# Patient Record
Sex: FEMALE | Race: WHITE | NOT HISPANIC OR LATINO | Employment: FULL TIME | URBAN - METROPOLITAN AREA
[De-identification: names, ages, dates, MRNs, and addresses within clinical notes are randomized per-mention and may not be internally consistent; named-entity substitution may affect disease eponyms.]

---

## 2017-04-07 ENCOUNTER — ALLSCRIPTS OFFICE VISIT (OUTPATIENT)
Dept: OTHER | Facility: OTHER | Age: 37
End: 2017-04-07

## 2018-01-17 NOTE — MISCELLANEOUS
Provider Comments  Provider Comments:   no show for appt      Signatures   Electronically signed by : Sandra Bautista DO; Apr 7 2017 10:02AM EST                       (Author)    Electronically signed by : Sandra Bautista DO;  Apr 7 2017 10:02AM EST                       (Author)

## 2019-04-29 ENCOUNTER — OFFICE VISIT (OUTPATIENT)
Dept: OBGYN CLINIC | Facility: CLINIC | Age: 39
End: 2019-04-29
Payer: COMMERCIAL

## 2019-04-29 VITALS
WEIGHT: 111.6 LBS | DIASTOLIC BLOOD PRESSURE: 82 MMHG | BODY MASS INDEX: 17.94 KG/M2 | HEIGHT: 66 IN | SYSTOLIC BLOOD PRESSURE: 118 MMHG

## 2019-04-29 DIAGNOSIS — N89.8 VAGINAL ODOR: ICD-10-CM

## 2019-04-29 DIAGNOSIS — Z12.31 ENCOUNTER FOR SCREENING MAMMOGRAM FOR MALIGNANT NEOPLASM OF BREAST: ICD-10-CM

## 2019-04-29 DIAGNOSIS — N92.6 IRREGULAR MENSES: ICD-10-CM

## 2019-04-29 DIAGNOSIS — R10.32 LLQ PAIN: ICD-10-CM

## 2019-04-29 DIAGNOSIS — Z01.419 ENCOUNTER FOR ROUTINE GYNECOLOGICAL EXAMINATION WITH PAPANICOLAOU SMEAR OF CERVIX: Primary | ICD-10-CM

## 2019-04-29 DIAGNOSIS — Z11.3 SCREENING FOR STDS (SEXUALLY TRANSMITTED DISEASES): ICD-10-CM

## 2019-04-29 PROCEDURE — 87591 N.GONORRHOEAE DNA AMP PROB: CPT | Performed by: OBSTETRICS & GYNECOLOGY

## 2019-04-29 PROCEDURE — 87510 GARDNER VAG DNA DIR PROBE: CPT | Performed by: OBSTETRICS & GYNECOLOGY

## 2019-04-29 PROCEDURE — G0124 SCREEN C/V THIN LAYER BY MD: HCPCS | Performed by: PATHOLOGY

## 2019-04-29 PROCEDURE — 87491 CHLMYD TRACH DNA AMP PROBE: CPT | Performed by: OBSTETRICS & GYNECOLOGY

## 2019-04-29 PROCEDURE — 87660 TRICHOMONAS VAGIN DIR PROBE: CPT | Performed by: OBSTETRICS & GYNECOLOGY

## 2019-04-29 PROCEDURE — G0145 SCR C/V CYTO,THINLAYER,RESCR: HCPCS | Performed by: PATHOLOGY

## 2019-04-29 PROCEDURE — 87624 HPV HI-RISK TYP POOLED RSLT: CPT | Performed by: OBSTETRICS & GYNECOLOGY

## 2019-04-29 PROCEDURE — S0610 ANNUAL GYNECOLOGICAL EXAMINA: HCPCS | Performed by: OBSTETRICS & GYNECOLOGY

## 2019-04-29 PROCEDURE — 87480 CANDIDA DNA DIR PROBE: CPT | Performed by: OBSTETRICS & GYNECOLOGY

## 2019-04-30 LAB
C TRACH DNA SPEC QL NAA+PROBE: NEGATIVE
N GONORRHOEA DNA SPEC QL NAA+PROBE: NEGATIVE

## 2019-05-01 LAB
CANDIDA RRNA VAG QL PROBE: NEGATIVE
G VAGINALIS RRNA GENITAL QL PROBE: POSITIVE
T VAGINALIS RRNA GENITAL QL PROBE: NEGATIVE

## 2019-05-03 ENCOUNTER — HOSPITAL ENCOUNTER (OUTPATIENT)
Dept: ULTRASOUND IMAGING | Facility: HOSPITAL | Age: 39
Discharge: HOME | End: 2019-05-03
Attending: OBSTETRICS & GYNECOLOGY
Payer: COMMERCIAL

## 2019-05-03 DIAGNOSIS — R10.32 LLQ PAIN: ICD-10-CM

## 2019-05-03 DIAGNOSIS — N92.6 IRREGULAR MENSES: ICD-10-CM

## 2019-05-03 PROCEDURE — 76830 TRANSVAGINAL US NON-OB: CPT

## 2019-05-03 PROCEDURE — 76856 US EXAM PELVIC COMPLETE: CPT

## 2019-05-06 DIAGNOSIS — B96.89 BV (BACTERIAL VAGINOSIS): Primary | ICD-10-CM

## 2019-05-06 DIAGNOSIS — N76.0 BV (BACTERIAL VAGINOSIS): Primary | ICD-10-CM

## 2019-05-06 LAB
LAB AP GYN PRIMARY INTERPRETATION: ABNORMAL
Lab: ABNORMAL
PATH INTERP SPEC-IMP: ABNORMAL

## 2019-05-07 ENCOUNTER — TELEPHONE (OUTPATIENT)
Dept: OBGYN CLINIC | Facility: MEDICAL CENTER | Age: 39
End: 2019-05-07

## 2019-05-07 DIAGNOSIS — N83.202 CYST OF LEFT OVARY: Primary | ICD-10-CM

## 2019-05-07 RX ORDER — METRONIDAZOLE 500 MG/1
500 TABLET ORAL EVERY 12 HOURS SCHEDULED
Qty: 14 TABLET | Refills: 0 | Status: SHIPPED | OUTPATIENT
Start: 2019-05-07 | End: 2019-05-14

## 2019-05-09 LAB
HPV HR 12 DNA CVX QL NAA+PROBE: POSITIVE
HPV16 DNA CVX QL NAA+PROBE: NEGATIVE
HPV18 DNA CVX QL NAA+PROBE: NEGATIVE

## 2019-05-17 ENCOUNTER — PROCEDURE VISIT (OUTPATIENT)
Dept: OBGYN CLINIC | Facility: CLINIC | Age: 39
End: 2019-05-17
Payer: COMMERCIAL

## 2019-05-17 VITALS — SYSTOLIC BLOOD PRESSURE: 134 MMHG | WEIGHT: 106.6 LBS | DIASTOLIC BLOOD PRESSURE: 90 MMHG | BODY MASS INDEX: 17.21 KG/M2

## 2019-05-17 DIAGNOSIS — R87.810 ASCUS WITH POSITIVE HIGH RISK HPV CERVICAL: Primary | ICD-10-CM

## 2019-05-17 DIAGNOSIS — N76.0 BACTERIAL VAGINOSIS: ICD-10-CM

## 2019-05-17 DIAGNOSIS — B96.89 BACTERIAL VAGINOSIS: ICD-10-CM

## 2019-05-17 DIAGNOSIS — R87.610 ASCUS WITH POSITIVE HIGH RISK HPV CERVICAL: Primary | ICD-10-CM

## 2019-05-17 DIAGNOSIS — Z76.89 ENCOUNTER FOR BIOPSY: ICD-10-CM

## 2019-05-17 LAB — SL AMB POCT URINE HCG: NEGATIVE

## 2019-05-17 PROCEDURE — 88305 TISSUE EXAM BY PATHOLOGIST: CPT | Performed by: PATHOLOGY

## 2019-05-17 PROCEDURE — 57454 BX/CURETT OF CERVIX W/SCOPE: CPT | Performed by: OBSTETRICS & GYNECOLOGY

## 2019-05-17 PROCEDURE — 81025 URINE PREGNANCY TEST: CPT | Performed by: OBSTETRICS & GYNECOLOGY

## 2019-05-20 ENCOUNTER — TELEPHONE (OUTPATIENT)
Dept: OBGYN CLINIC | Facility: MEDICAL CENTER | Age: 39
End: 2019-05-20

## 2019-05-21 DIAGNOSIS — B96.89 BACTERIAL VAGINOSIS: ICD-10-CM

## 2019-05-21 DIAGNOSIS — N76.0 BACTERIAL VAGINOSIS: ICD-10-CM

## 2019-06-14 ENCOUNTER — OFFICE VISIT (OUTPATIENT)
Dept: OBGYN CLINIC | Facility: CLINIC | Age: 39
End: 2019-06-14
Payer: COMMERCIAL

## 2019-06-14 VITALS — DIASTOLIC BLOOD PRESSURE: 70 MMHG | SYSTOLIC BLOOD PRESSURE: 104 MMHG | BODY MASS INDEX: 17.72 KG/M2 | WEIGHT: 109.8 LBS

## 2019-06-14 DIAGNOSIS — N87.1 DYSPLASIA OF CERVIX, HIGH GRADE CIN 2: Primary | ICD-10-CM

## 2019-06-14 PROCEDURE — 99214 OFFICE O/P EST MOD 30 MIN: CPT | Performed by: OBSTETRICS & GYNECOLOGY

## 2019-06-14 RX ORDER — UBIDECARENONE 75 MG
CAPSULE ORAL DAILY
COMMUNITY

## 2019-06-14 RX ORDER — DIPHENOXYLATE HYDROCHLORIDE AND ATROPINE SULFATE 2.5; .025 MG/1; MG/1
1 TABLET ORAL DAILY
COMMUNITY

## 2019-06-18 ENCOUNTER — HOSPITAL ENCOUNTER (OUTPATIENT)
Dept: ULTRASOUND IMAGING | Facility: HOSPITAL | Age: 39
Discharge: HOME | End: 2019-06-18
Attending: OBSTETRICS & GYNECOLOGY
Payer: COMMERCIAL

## 2019-06-18 DIAGNOSIS — N83.202 CYST OF LEFT OVARY: ICD-10-CM

## 2019-06-18 PROCEDURE — 76856 US EXAM PELVIC COMPLETE: CPT

## 2019-09-13 ENCOUNTER — OFFICE VISIT (OUTPATIENT)
Dept: OBGYN CLINIC | Facility: CLINIC | Age: 39
End: 2019-09-13

## 2019-09-13 VITALS — BODY MASS INDEX: 17 KG/M2 | DIASTOLIC BLOOD PRESSURE: 76 MMHG | WEIGHT: 105.3 LBS | SYSTOLIC BLOOD PRESSURE: 110 MMHG

## 2019-09-13 DIAGNOSIS — Z01.818 PREOPERATIVE EXAM FOR GYNECOLOGIC SURGERY: Primary | ICD-10-CM

## 2019-09-13 DIAGNOSIS — N87.1 CIN II (CERVICAL INTRAEPITHELIAL NEOPLASIA II): ICD-10-CM

## 2019-09-13 PROCEDURE — PREOP: Performed by: OBSTETRICS & GYNECOLOGY

## 2019-09-13 RX ORDER — SODIUM CHLORIDE, SODIUM LACTATE, POTASSIUM CHLORIDE, CALCIUM CHLORIDE 600; 310; 30; 20 MG/100ML; MG/100ML; MG/100ML; MG/100ML
125 INJECTION, SOLUTION INTRAVENOUS CONTINUOUS
Status: CANCELLED | OUTPATIENT
Start: 2019-09-23

## 2019-09-13 NOTE — H&P (VIEW-ONLY)
H&P Exam - Gynecology     Assessment: Diane Ramos was seen today for pre-op exam     Diagnoses and all orders for this visit:    Preoperative exam for gynecologic surgery    DENISE II (cervical intraepithelial neoplasia II)        Plan:  Patient desires to proceed with definitive surgical management  We plan to proceed with LEEP  Consent reviewed and signed  Reviewed risks of procedure  Risks include infection, blood clots, risk of wound healing problems, and possible damage or injury to surrounding structures including bowel, bladder, ureters, blood vessels and nerves  Pre and postoperative instructions reviewed with patient  Entire surgical packet reviewed  Patient will not need to present to pre admissions testing  Procedure planned for 9/23/2019  All questions answered  Subjective:  Patient presents for preoperative examination  Patient was DENISE 2-3 on her colposcopy biopsies  Patient Active Problem List   Diagnosis    DENISE II (cervical intraepithelial neoplasia II)       Past Medical History:   Diagnosis Date    Abnormal Pap smear of cervix        History reviewed  No pertinent surgical history  Family History   Problem Relation Age of Onset    Stroke Mother     Diabetes Maternal Grandmother     Heart disease Paternal Grandfather        Social History     No Known Allergies      Current Outpatient Medications:     cyanocobalamin (VITAMIN B-12) 100 mcg tablet, Take by mouth daily, Disp: , Rfl:     multivitamin (THERAGRAN) TABS, Take 1 tablet by mouth daily, Disp: , Rfl:     Review of Systems  Constitutional :no fever, feels well, no tiredness, no recent weight gain or loss  ENT: no ear ache, no loss of hearing, no nosebleeds or nasal discharge, no sore throat or hoarseness  Cardiovascular: no complaints of slow or fast heart beat, no chest pain, no palpitations, no leg claudication or lower extremity edema    Respiratory: no complaints of shortness of shortness of breath, no MATAMOROS  Breasts:no complaints of breast pain, breast lump, or nipple discharge  Gastrointestinal: no complaints of abdominal pain, constipation, nausea, vomiting, or diarrhea or bloody stools  Genitourinary : no complaints of dysuria, incontinence, pelvic pain, no dysmenorrhea, vaginal discharge or abnormal vaginal bleeding and as noted in HPI  Musculoskeletal: no complaints of arthralgia, no myalgia, no joint swelling or stiffness, no limb pain or swelling  Integumentary: no complaints of skin rash or lesion, itching or dry skin  Neurological: no complaints of headache, no confusion, no numbness or tingling, no dizziness or fainting    Objective:     /76   Wt 47 8 kg (105 lb 4 8 oz)   LMP 09/02/2019   BMI 17 00 kg/m²    General: appears stated age, cooperative, alert normal mood and affect   Psychiatric oriented to person, place and time  Mood and affect normal   Neck: normal, supple,trachea midline, no masses  Thyroid: normal, no thyromegaly   Heart: regular rate and rhythm, S1, S2 normal, no murmur, click, rub or gallop   Lungs: clear to auscultation bilaterally, no increased work of breathing or signs of respiratory distress   Breasts: normal, no dimpling or skin changes noted   Abdomen: soft, non-tender, without masses or organomegaly   Vulva: normal , no lesions   Vagina: normal , no lesions or dryness   Urethra: normal   Urethal meatus normal   Bladder Normal, soft, non-tender and no prolapse or masses appreciated   Cervix: normal, no palpable masses    Uterus: normal , non-tender, not enlarged, no palpable masses   Adnexa: normal, non-tender without fullness or masses   Lymphatic Palpation of lymph nodes in neck, axilla, groin and/or other locations: no lymphadenopathy or masses noted   Skin Normal skin turgor and no rashes    Palpation of skin and subcutaneous tissue normal

## 2019-09-13 NOTE — PROGRESS NOTES
H&P Exam - Gynecology     Assessment: Leila Bain was seen today for pre-op exam     Diagnoses and all orders for this visit:    Preoperative exam for gynecologic surgery    DENISE II (cervical intraepithelial neoplasia II)        Plan:  Patient desires to proceed with definitive surgical management  We plan to proceed with LEEP  Consent reviewed and signed  Reviewed risks of procedure  Risks include infection, blood clots, risk of wound healing problems, and possible damage or injury to surrounding structures including bowel, bladder, ureters, blood vessels and nerves  Pre and postoperative instructions reviewed with patient  Entire surgical packet reviewed  Patient will not need to present to pre admissions testing  Procedure planned for 9/23/2019  All questions answered  Subjective:  Patient presents for preoperative examination  Patient was DENISE 2-3 on her colposcopy biopsies  Patient Active Problem List   Diagnosis    DENISE II (cervical intraepithelial neoplasia II)       Past Medical History:   Diagnosis Date    Abnormal Pap smear of cervix        History reviewed  No pertinent surgical history  Family History   Problem Relation Age of Onset    Stroke Mother     Diabetes Maternal Grandmother     Heart disease Paternal Grandfather        Social History     No Known Allergies      Current Outpatient Medications:     cyanocobalamin (VITAMIN B-12) 100 mcg tablet, Take by mouth daily, Disp: , Rfl:     multivitamin (THERAGRAN) TABS, Take 1 tablet by mouth daily, Disp: , Rfl:     Review of Systems  Constitutional :no fever, feels well, no tiredness, no recent weight gain or loss  ENT: no ear ache, no loss of hearing, no nosebleeds or nasal discharge, no sore throat or hoarseness  Cardiovascular: no complaints of slow or fast heart beat, no chest pain, no palpitations, no leg claudication or lower extremity edema    Respiratory: no complaints of shortness of shortness of breath, no MATAMOROS  Breasts:no complaints of breast pain, breast lump, or nipple discharge  Gastrointestinal: no complaints of abdominal pain, constipation, nausea, vomiting, or diarrhea or bloody stools  Genitourinary : no complaints of dysuria, incontinence, pelvic pain, no dysmenorrhea, vaginal discharge or abnormal vaginal bleeding and as noted in HPI  Musculoskeletal: no complaints of arthralgia, no myalgia, no joint swelling or stiffness, no limb pain or swelling  Integumentary: no complaints of skin rash or lesion, itching or dry skin  Neurological: no complaints of headache, no confusion, no numbness or tingling, no dizziness or fainting    Objective:     /76   Wt 47 8 kg (105 lb 4 8 oz)   LMP 09/02/2019   BMI 17 00 kg/m²    General: appears stated age, cooperative, alert normal mood and affect   Psychiatric oriented to person, place and time  Mood and affect normal   Neck: normal, supple,trachea midline, no masses  Thyroid: normal, no thyromegaly   Heart: regular rate and rhythm, S1, S2 normal, no murmur, click, rub or gallop   Lungs: clear to auscultation bilaterally, no increased work of breathing or signs of respiratory distress   Breasts: normal, no dimpling or skin changes noted   Abdomen: soft, non-tender, without masses or organomegaly   Vulva: normal , no lesions   Vagina: normal , no lesions or dryness   Urethra: normal   Urethal meatus normal   Bladder Normal, soft, non-tender and no prolapse or masses appreciated   Cervix: normal, no palpable masses    Uterus: normal , non-tender, not enlarged, no palpable masses   Adnexa: normal, non-tender without fullness or masses   Lymphatic Palpation of lymph nodes in neck, axilla, groin and/or other locations: no lymphadenopathy or masses noted   Skin Normal skin turgor and no rashes    Palpation of skin and subcutaneous tissue normal

## 2019-09-13 NOTE — H&P
H&P Exam - Gynecology     Assessment: Ledora Dancer was seen today for pre-op exam     Diagnoses and all orders for this visit:    Preoperative exam for gynecologic surgery    DENISE II (cervical intraepithelial neoplasia II)        Plan:  Patient desires to proceed with definitive surgical management  We plan to proceed with LEEP  Consent reviewed and signed  Reviewed risks of procedure  Risks include infection, blood clots, risk of wound healing problems, and possible damage or injury to surrounding structures including bowel, bladder, ureters, blood vessels and nerves  Pre and postoperative instructions reviewed with patient  Entire surgical packet reviewed  Patient will not need to present to pre admissions testing  Procedure planned for 9/23/2019  All questions answered  Subjective:  Patient presents for preoperative examination  Patient was DENISE 2-3 on her colposcopy biopsies  Patient Active Problem List   Diagnosis    DENISE II (cervical intraepithelial neoplasia II)       Past Medical History:   Diagnosis Date    Abnormal Pap smear of cervix        History reviewed  No pertinent surgical history  Family History   Problem Relation Age of Onset    Stroke Mother     Diabetes Maternal Grandmother     Heart disease Paternal Grandfather        Social History     No Known Allergies      Current Outpatient Medications:     cyanocobalamin (VITAMIN B-12) 100 mcg tablet, Take by mouth daily, Disp: , Rfl:     multivitamin (THERAGRAN) TABS, Take 1 tablet by mouth daily, Disp: , Rfl:     Review of Systems  Constitutional :no fever, feels well, no tiredness, no recent weight gain or loss  ENT: no ear ache, no loss of hearing, no nosebleeds or nasal discharge, no sore throat or hoarseness  Cardiovascular: no complaints of slow or fast heart beat, no chest pain, no palpitations, no leg claudication or lower extremity edema    Respiratory: no complaints of shortness of shortness of breath, no MATAMOROS  Breasts:no complaints of breast pain, breast lump, or nipple discharge  Gastrointestinal: no complaints of abdominal pain, constipation, nausea, vomiting, or diarrhea or bloody stools  Genitourinary : no complaints of dysuria, incontinence, pelvic pain, no dysmenorrhea, vaginal discharge or abnormal vaginal bleeding and as noted in HPI  Musculoskeletal: no complaints of arthralgia, no myalgia, no joint swelling or stiffness, no limb pain or swelling  Integumentary: no complaints of skin rash or lesion, itching or dry skin  Neurological: no complaints of headache, no confusion, no numbness or tingling, no dizziness or fainting    Objective:     /76   Wt 47 8 kg (105 lb 4 8 oz)   LMP 09/02/2019   BMI 17 00 kg/m²    General: appears stated age, cooperative, alert normal mood and affect   Psychiatric oriented to person, place and time  Mood and affect normal   Neck: normal, supple,trachea midline, no masses  Thyroid: normal, no thyromegaly   Heart: regular rate and rhythm, S1, S2 normal, no murmur, click, rub or gallop   Lungs: clear to auscultation bilaterally, no increased work of breathing or signs of respiratory distress   Breasts: normal, no dimpling or skin changes noted   Abdomen: soft, non-tender, without masses or organomegaly   Vulva: normal , no lesions   Vagina: normal , no lesions or dryness   Urethra: normal   Urethal meatus normal   Bladder Normal, soft, non-tender and no prolapse or masses appreciated   Cervix: normal, no palpable masses    Uterus: normal , non-tender, not enlarged, no palpable masses   Adnexa: normal, non-tender without fullness or masses   Lymphatic Palpation of lymph nodes in neck, axilla, groin and/or other locations: no lymphadenopathy or masses noted   Skin Normal skin turgor and no rashes    Palpation of skin and subcutaneous tissue normal

## 2019-09-20 ENCOUNTER — ANESTHESIA EVENT (OUTPATIENT)
Dept: PERIOP | Facility: HOSPITAL | Age: 39
End: 2019-09-20
Payer: COMMERCIAL

## 2019-09-20 ENCOUNTER — TELEPHONE (OUTPATIENT)
Dept: OBGYN CLINIC | Facility: MEDICAL CENTER | Age: 39
End: 2019-09-20

## 2019-09-20 RX ORDER — NAPROXEN SODIUM 220 MG
220 TABLET ORAL AS NEEDED
COMMUNITY

## 2019-09-20 NOTE — PRE-PROCEDURE INSTRUCTIONS
Pre-Surgery Instructions:   Medication Instructions    cyanocobalamin (VITAMIN B-12) 100 mcg tablet Instructed patient per Anesthesia Guidelines   multivitamin (THERAGRAN) TABS Instructed patient per Anesthesia Guidelines   naproxen sodium (ALEVE) 220 MG tablet Instructed patient per Anesthesia Guidelines  Per anesthesia patient was told to stop NSAIDS and supplements as of today and no medications are needed on morning of surgery  Patient states she had wanted Dr to also remove an ovarian cyst at time of this procedure and plans to reach Dr today to discuss

## 2019-09-20 NOTE — TELEPHONE ENCOUNTER
Pt called with questions in regards to LEEP 9/24/19  Pt under impression she would also be having ovarian cyst removed  Per Dr Fernanda Perry cyst was resolving on it's own based off of last ultrasound so surgery was not necessary  Pt aware

## 2019-09-23 ENCOUNTER — HOSPITAL ENCOUNTER (OUTPATIENT)
Facility: HOSPITAL | Age: 39
Setting detail: OUTPATIENT SURGERY
Discharge: HOME | End: 2019-09-23
Attending: OBSTETRICS & GYNECOLOGY | Admitting: OBSTETRICS & GYNECOLOGY
Payer: COMMERCIAL

## 2019-09-23 ENCOUNTER — ANESTHESIA (OUTPATIENT)
Dept: PERIOP | Facility: HOSPITAL | Age: 39
End: 2019-09-23
Payer: COMMERCIAL

## 2019-09-23 VITALS
HEIGHT: 66 IN | OXYGEN SATURATION: 100 % | WEIGHT: 105 LBS | SYSTOLIC BLOOD PRESSURE: 130 MMHG | RESPIRATION RATE: 15 BRPM | DIASTOLIC BLOOD PRESSURE: 78 MMHG | TEMPERATURE: 97.5 F | HEART RATE: 59 BPM | BODY MASS INDEX: 16.88 KG/M2

## 2019-09-23 DIAGNOSIS — Z98.890 S/P LEEP (LOOP ELECTROSURGICAL EXCISION PROCEDURE): Primary | ICD-10-CM

## 2019-09-23 DIAGNOSIS — N87.1 CIN II (CERVICAL INTRAEPITHELIAL NEOPLASIA II): ICD-10-CM

## 2019-09-23 LAB
EXT PREGNANCY TEST URINE: NEGATIVE
EXT. CONTROL: NORMAL

## 2019-09-23 PROCEDURE — 88307 TISSUE EXAM BY PATHOLOGIST: CPT | Performed by: PATHOLOGY

## 2019-09-23 PROCEDURE — 57460 BX OF CERVIX W/SCOPE LEEP: CPT | Performed by: OBSTETRICS & GYNECOLOGY

## 2019-09-23 PROCEDURE — 81025 URINE PREGNANCY TEST: CPT | Performed by: OBSTETRICS & GYNECOLOGY

## 2019-09-23 RX ORDER — MIDAZOLAM HYDROCHLORIDE 1 MG/ML
INJECTION INTRAMUSCULAR; INTRAVENOUS AS NEEDED
Status: DISCONTINUED | OUTPATIENT
Start: 2019-09-23 | End: 2019-09-23 | Stop reason: SURG

## 2019-09-23 RX ORDER — IBUPROFEN 400 MG/1
600 TABLET ORAL EVERY 6 HOURS PRN
Qty: 20 TABLET | Refills: 0 | Status: SHIPPED | OUTPATIENT
Start: 2019-09-23

## 2019-09-23 RX ORDER — LIDOCAINE HYDROCHLORIDE 10 MG/ML
INJECTION, SOLUTION INFILTRATION; PERINEURAL AS NEEDED
Status: DISCONTINUED | OUTPATIENT
Start: 2019-09-23 | End: 2019-09-23 | Stop reason: SURG

## 2019-09-23 RX ORDER — ONDANSETRON 2 MG/ML
4 INJECTION INTRAMUSCULAR; INTRAVENOUS ONCE AS NEEDED
Status: DISCONTINUED | OUTPATIENT
Start: 2019-09-23 | End: 2019-09-23 | Stop reason: HOSPADM

## 2019-09-23 RX ORDER — ACETAMINOPHEN 325 MG/1
650 TABLET ORAL EVERY 6 HOURS PRN
Status: DISCONTINUED | OUTPATIENT
Start: 2019-09-23 | End: 2019-09-23 | Stop reason: HOSPADM

## 2019-09-23 RX ORDER — DEXAMETHASONE SODIUM PHOSPHATE 10 MG/ML
INJECTION, SOLUTION INTRAMUSCULAR; INTRAVENOUS AS NEEDED
Status: DISCONTINUED | OUTPATIENT
Start: 2019-09-23 | End: 2019-09-23 | Stop reason: SURG

## 2019-09-23 RX ORDER — ONDANSETRON 2 MG/ML
INJECTION INTRAMUSCULAR; INTRAVENOUS AS NEEDED
Status: DISCONTINUED | OUTPATIENT
Start: 2019-09-23 | End: 2019-09-23 | Stop reason: SURG

## 2019-09-23 RX ORDER — SODIUM CHLORIDE 9 MG/ML
INJECTION, SOLUTION INTRAVENOUS AS NEEDED
Status: DISCONTINUED | OUTPATIENT
Start: 2019-09-23 | End: 2019-09-23 | Stop reason: HOSPADM

## 2019-09-23 RX ORDER — ONDANSETRON 2 MG/ML
4 INJECTION INTRAMUSCULAR; INTRAVENOUS EVERY 6 HOURS PRN
Status: DISCONTINUED | OUTPATIENT
Start: 2019-09-23 | End: 2019-09-23 | Stop reason: HOSPADM

## 2019-09-23 RX ORDER — PROPOFOL 10 MG/ML
INJECTION, EMULSION INTRAVENOUS AS NEEDED
Status: DISCONTINUED | OUTPATIENT
Start: 2019-09-23 | End: 2019-09-23 | Stop reason: SURG

## 2019-09-23 RX ORDER — SODIUM CHLORIDE, SODIUM LACTATE, POTASSIUM CHLORIDE, CALCIUM CHLORIDE 600; 310; 30; 20 MG/100ML; MG/100ML; MG/100ML; MG/100ML
125 INJECTION, SOLUTION INTRAVENOUS CONTINUOUS
Status: DISCONTINUED | OUTPATIENT
Start: 2019-09-23 | End: 2019-09-23 | Stop reason: HOSPADM

## 2019-09-23 RX ORDER — SODIUM CHLORIDE 9 MG/ML
125 INJECTION, SOLUTION INTRAVENOUS CONTINUOUS
Status: DISCONTINUED | OUTPATIENT
Start: 2019-09-23 | End: 2019-09-23 | Stop reason: HOSPADM

## 2019-09-23 RX ORDER — ACETIC ACID 5 %
LIQUID (ML) MISCELLANEOUS AS NEEDED
Status: DISCONTINUED | OUTPATIENT
Start: 2019-09-23 | End: 2019-09-23 | Stop reason: HOSPADM

## 2019-09-23 RX ORDER — FENTANYL CITRATE/PF 50 MCG/ML
25 SYRINGE (ML) INJECTION
Status: DISCONTINUED | OUTPATIENT
Start: 2019-09-23 | End: 2019-09-23 | Stop reason: HOSPADM

## 2019-09-23 RX ORDER — SENNOSIDES 8.6 MG
650 CAPSULE ORAL EVERY 8 HOURS PRN
Qty: 30 TABLET | Refills: 0 | Status: SHIPPED | OUTPATIENT
Start: 2019-09-23

## 2019-09-23 RX ORDER — IBUPROFEN 600 MG/1
600 TABLET ORAL EVERY 6 HOURS PRN
Status: DISCONTINUED | OUTPATIENT
Start: 2019-09-23 | End: 2019-09-23 | Stop reason: HOSPADM

## 2019-09-23 RX ADMIN — LIDOCAINE HYDROCHLORIDE 50 MG: 10 INJECTION, SOLUTION INFILTRATION; PERINEURAL at 07:37

## 2019-09-23 RX ADMIN — FENTANYL CITRATE 25 MCG: 50 INJECTION INTRAMUSCULAR; INTRAVENOUS at 08:56

## 2019-09-23 RX ADMIN — SODIUM CHLORIDE 125 ML/HR: 0.9 INJECTION, SOLUTION INTRAVENOUS at 08:55

## 2019-09-23 RX ADMIN — FENTANYL CITRATE 25 MCG: 50 INJECTION INTRAMUSCULAR; INTRAVENOUS at 08:47

## 2019-09-23 RX ADMIN — PROPOFOL 200 MG: 10 INJECTION, EMULSION INTRAVENOUS at 07:37

## 2019-09-23 RX ADMIN — SODIUM CHLORIDE 125 ML/HR: 0.9 INJECTION, SOLUTION INTRAVENOUS at 06:26

## 2019-09-23 RX ADMIN — ONDANSETRON 4 MG: 2 INJECTION INTRAMUSCULAR; INTRAVENOUS at 07:59

## 2019-09-23 RX ADMIN — DEXAMETHASONE SODIUM PHOSPHATE 4 MG: 10 INJECTION, SOLUTION INTRAMUSCULAR; INTRAVENOUS at 07:59

## 2019-09-23 RX ADMIN — MIDAZOLAM 2 MG: 1 INJECTION INTRAMUSCULAR; INTRAVENOUS at 07:32

## 2019-09-23 NOTE — DISCHARGE INSTRUCTIONS
Loop Electrosurgical Excision Procedure   WHAT YOU NEED TO KNOW:   You may have cramping, bleeding, or dark brown discharge after your procedure  These symptoms may last up to 4 weeks  DISCHARGE INSTRUCTIONS:   Seek care immediately if:   · You have severe pain in your lower abdomen  · You soak through 1 sanitary pad in 1 hour or less  · You feel weak, dizzy, or faint  Contact your healthcare provider if:   · You have a fever, chills, or foul-smelling discharge  · You have bleeding with clots  · Your bleeding is heavier than your menstrual period  · Your pain gets worse or does not get better after you take pain medicine  · You have questions or concerns about your condition or care  Medicines:   · NSAIDs , such as ibuprofen, help decrease swelling, pain, and fever  NSAIDs can cause stomach bleeding or kidney problems in certain people  If you take blood thinner medicine, always ask your healthcare provider if NSAIDs are safe for you  Always read the medicine label and follow directions  · Take your medicine as directed  Contact your healthcare provider if you think your medicine is not helping or if you have side effects  Tell him or her if you are allergic to any medicine  Keep a list of the medicines, vitamins, and herbs you take  Include the amounts, and when and why you take them  Bring the list or the pill bottles to follow-up visits  Carry your medicine list with you in case of an emergency  Activity:  Rest for 48 hours or as directed  Do not exercise, play sports, or lift anything heavier than 5 pounds  Do not go swimming or get in a hot tub  Ask your healthcare provider when you can return to your usual activities  Bathing:  Shower only after your procedure  Do not take a bath  Baths may increase your risk for an infection  Ask your healthcare provider how long to follow these instructions     Do not put anything in your vagina for 2 weeks:  Do not douche, use medicines in your vagina, or have sex  Do not use tampons  Instead, wear a sanitary pad for bleeding  Get pap smears as directed:  A pap smear can help diagnose cervical cancer early  Cervical cancer that is diagnosed early is easier to treat  Do not smoke:  Nicotine and other chemicals in cigarettes and cigars can increase your risk for cervical cancer  Ask your healthcare provider for more information if you currently smoke and need help to quit  E-cigarettes or smokeless tobacco still contain nicotine  Talk to your healthcare provider before you use these products  Practice safe sex:  Safe sex can help decrease your risk for sexually transmitted infections (STIs)  STIs can cause cervical cancer  Limit your number of sex partners  Use condoms and barrier methods for all types of sexual contact  Use a new condom or latex barrier each time you have sex  This includes oral, vaginal, and anal sex  Make sure that the condom fits and is put on correctly  Follow up with your healthcare provider as directed:  Write down your questions so you remember to ask them during your visits  © 2017 [facility] Information is for End User's use only and may not be sold, redistributed or otherwise used for commercial purposes  All illustrations and images included in CareNotes® are the copyrighted property of [employer] , [employer]  or Amauri Aguilera  The above information is an  only  It is not intended as medical advice for individual conditions or treatments  Talk to your doctor, nurse or pharmacist before following any medical regimen to see if it is safe and effective for you

## 2019-09-23 NOTE — INTERVAL H&P NOTE
H&P reviewed  After examining the patient I find no changes in the patients condition since the H&P had been written      Vitals:    09/23/19 0616   BP: 129/79   Pulse: 55   Resp: 16   Temp: 98 °F (36 7 °C)   SpO2: 100%

## 2019-09-23 NOTE — ANESTHESIA PREPROCEDURE EVALUATION
Review of Systems/Medical History  Patient summary reviewed  Chart reviewed  No history of anesthetic complications     Cardiovascular  Negative cardio ROS    Pulmonary  Negative pulmonary ROS        GI/Hepatic  Negative GI/hepatic ROS          Negative  ROS        Endo/Other  Negative endo/other ROS      GYN      Comment: Abnormal Pap-DENISE II     Hematology  Negative hematology ROS      Musculoskeletal  Negative musculoskeletal ROS        Neurology  Negative neurology ROS      Psychology   Negative psychology ROS              Physical Exam    Airway    Mallampati score: II  TM Distance: >3 FB  Neck ROM: full     Dental   No notable dental hx     Cardiovascular  Comment: Negative ROS, Rhythm: regular, Rate: normal, Cardiovascular exam normal    Pulmonary  Pulmonary exam normal Breath sounds clear to auscultation,     Other Findings        Anesthesia Plan  ASA Score- 2     Anesthesia Type- general with ASA Monitors  Additional Monitors:   Airway Plan:         Plan Factors-Patient not instructed to abstain from smoking on day of procedure  Patient did not smoke on day of surgery  Induction- intravenous  Postoperative Plan-     Informed Consent- Anesthetic plan and risks discussed with patient

## 2019-09-23 NOTE — OP NOTE
OPERATIVE REPORT  PATIENT NAME: Luci Butler    :  1980  MRN: [id number]  Pt Location: [facility] OR ROOM 03    SURGERY DATE: 2019    Surgeon(s) and Role:     * Laura Underwood MD - Primary    Preop Diagnosis:  DENISE II (cervical intraepithelial neoplasia II) [N87 1]    Post-Op Diagnosis Codes:     * DENISE II (cervical intraepithelial neoplasia II) [N87 1]    Procedure(s) (LRB):  BIOPSY LEEP CERVIX (N/A)    Specimen(s):  ID Type Source Tests Collected by Time Destination   1 : suture marks superior cervical lip Tissue Cervix TISSUE EXAM Laura Underwood MD 2019 [id number]        Estimated Blood Loss:   Minimal    Drains:  none    Anesthesia Type:   General     Operative Indications:  DENISE II (cervical intraepithelial neoplasia II) [N87 1]      Operative Findings:  Normal appearing external feminine genitals  Bi-manual examination with uterus in middle position  Colposcopy with evident whitish flat lesion at 3 o'clock in relation with external OS  Nabothian cysts evidence in posterior cervical lip  Complications:   None    Surgical risks: The patient was informed of all the risks and benefits of a loop electrosurgical excision procedure  Risks included but were not limited to bleeding, infection, injury to the vulva, vagina, cervix, uterine perforation, or negative effects on future pregnancy outcomes  The patient expressed understanding the risks involved, all portions were answered, the patient was consented to the procedure  Description of the procedure: The patient was taken to the operating room  IV sedation was administered and found to be adequate  The patient was then positioned on the operating table in dorsolithotomy position with legs supported by stirrups and SCDs bilaterally  All pressure points were padded  Warm blanket was placed to maintain control of core body temperature  The patient was then prepped and draped in usual sterile fashion   A bimanual exam was performed and uterus is found to be middle position  Operative technique: A timeout was performed to confirm correct patient and correct procedure  The bladder was emptied with a straight catheter and 50 milliliters of clear yellow urine were obtained  A bivalved coated speculum was inserted into the vagina and the cervix was visualized  colposcopy was performed with evidence of lesion at 3 o'clock positions, 5 milliliters total  The 20x15 loop electrode was selected and used to excise the cervical sample using the 20 x 15 loop  The gross specimen was removed and sent to pathology, a second excision was performed using the 20 x 12 loop electrode  The specimens were sent to pathology  Ball electrocautery was used to obtain adequate hemostasis  Monsel's was then applied to maintain hemostasis  Good hemostasis was confirmed  The bivalve speculum was removed from the vagina  At the completion of the procedure all needle, sponge, instrument counts were noted to be correct ×2  Dr Tavares Mireles was present for all key portions of the procedure        Patient Disposition:  PACU     SIGNATURE: Bryon Richards MD  DATE: September 23, 2019  TIME: 8:30 AM

## 2019-09-25 ENCOUNTER — TELEPHONE (OUTPATIENT)
Dept: OBGYN CLINIC | Facility: MEDICAL CENTER | Age: 39
End: 2019-09-25

## 2019-09-25 NOTE — TELEPHONE ENCOUNTER
RAOUL 9/23/19 with Dr Zachary Chi   C/O mild cramping relieved with motrin  States minimal brown vaginal drainage  Has f/u appointment scheduled

## 2019-09-28 ENCOUNTER — TELEPHONE (OUTPATIENT)
Dept: OTHER | Facility: OTHER | Age: 39
End: 2019-09-28

## 2019-09-28 NOTE — TELEPHONE ENCOUNTER
Daren Rodriguez 1980  Call Id: [id number]  [facility] Name:  Relationship To  Patient: Self  Return Phone Number: [phone] (Home)  Presenting Problem: "I had a pre cancerous cells removed  from my cervix on Monday and I am  currently bleeding a lot "  Nurse Assessment  Nurse: STEFANIA Oleary Austin Date/Time: 9/28/2019 2:31:41 PM  Type of assessment required:  ---General (Adult or Child)  Duration of Current S/S  ---Vaginal bleeding increased at about 2230 last night after being on her feet for 2 hours  earlier in the day  Location/Radiation  ---Vaginal  Temperature (F) and route:  ---Denied  Symptom Specific Meds (Dose/Time):  ---Ibuprofen, 200 mg, one tablet @ 2300 yesterday  Denies being on blood thinners  Other S/S  ---Soaked a pad between 0030 until 0200  Woke up again at 0430 and pad was soaked  Soaked again at 0900  Soaked a pad again by 1100  Soaking a pad once an hour for the  past 3 5 hours  Passed blood clot with last change  Denies cramping  Left side pain,  over ovary  Pain Scale on scale of 1-10, 10 being the worst:  ---Side pain rated 2/10  Symptom progression:  ---worse  Intake and Output  ---Normal appetite, fluid intake and urine output  LMP/ Pregnancy:  ---Patient is due for her period now  Breastfeeding  ---No  Last Exam/Treatment:  ---9/23/19 had a loop electrosurgical excision procedure for cervical intraepithelial  neoplasia II  Protocols  Protocol Title Nurse Date/Time  Vaginal Bleeding - Abnormal Ranft, RN, Kenney 9/28/2019 2:44:33 PM  Question Caller Affirmed  Disp   Time Disposition Final User  9/28/2019 3:01:30 PM See Physician within 4 Hours (or PCP  triage)  STEFANIA Oleary Austin  9/28/2019 3:03:27 PM Send to Follow Up Queue STEFANIA Oleary Austin  9/28/2019 5:21:58 PM RN Triaged STEFANIA Oleary Austin  9/28/2019 5:22:20 PM RN Triaged Yes STEFANIA Oleary, EVANGELISTA DEUTSCH  [facility] FOR [facility] WITH DEVELOPMENTAL Advice Given Per Protocol  SEE PHYSICIAN WITHIN 4 HOURS (or PCP triage): * IF OFFICE WILL BE CLOSED AND PCP TRIAGE REQUIRED: You may  need to be seen  Your doctor will want to talk with you to decide what's best  I'll page the doctor now  If you haven't heard from the oncall  doctor within 30 minutes, call again  NOTE: If PCP can't be reached, send to [facility] or ER  CALL BACK IF: * You become worse  CARE ADVICE given per Vaginal Bleeding, Abnormal (Adult) guideline  Dr Colt Lujan was paged via AP pager @ [phone]: [phone]/  Denver Colander RN/ [employer]/ Chaz Borden/ 1980/ heavy bleeding, S/P LEEP on 9 23 19  Dr  Beckey Boon called back at [phone] and was notified  of the procedure patient had done and heavy bleeding  MD was notified that patient is due for her period now  MD advised that after a  LEEP, the first menses is usually heavy  She advised that patient take 600 mg of Ibuprofen now and then every 6 hours PRN for heavy  bleeding or pain  Advised that patient should go to the ER at [facility] if bleeding doesn't slow down within 1-2 hours  Advised that if patient is not comfortable in trying this hoecare advice, she should go to the ER now  Requested that she be paged to  notify her if patient decides to go to the ER  Dr Colt Lujan was paged via AP @ : [phone]/ Denver Colander RN/ [employer]/ Clarice 58  1980/ patient is going to take Ibuprofen now, I will do a follwo-up call on her in about 1 5 hours  Caller Understands: Yes  Caller Disagree/Comply: Comply  PreDisposition: Unsure  Comments  User: Obie Madrid RN Date/Time: 9/28/2019 3:03:11 PM  I called patient and relayed Dr Kevin Reyes advice to her  Patient is going to try taking Ibuprofen to see if bleeding will slow down  I  will call her in about 1 5 hours for follow-up  [facility] wor[facility], but to call  me back before going so that I can notify Dr Colt Lujan  User: Obie Madrid RN Date/Time: 9/28/2019 5:21:21 PM  Follow-up call completed  Patient stated that she is not bleeding as heavily since she took ibuprofen, 600 mg   I paged Dr Colt Lujan via [facility] [employer] @ [phone]: [phone]/ Izabella Trujillo RN/ [employer]/ Key Borden/ 1980/ notifying you that bleeding decreased, no call back needed

## 2019-10-02 ENCOUNTER — HOSPITAL ENCOUNTER (EMERGENCY)
Facility: HOSPITAL | Age: 39
End: 2019-10-03
Attending: INTERNAL MEDICINE | Admitting: INTERNAL MEDICINE
Payer: COMMERCIAL

## 2019-10-02 DIAGNOSIS — R58 EXTRAVASATION OF BLOOD: Primary | ICD-10-CM

## 2019-10-02 DIAGNOSIS — N93.9 VAGINAL BLEEDING: ICD-10-CM

## 2019-10-02 PROCEDURE — 99285 EMERGENCY DEPT VISIT HI MDM: CPT

## 2019-10-02 PROCEDURE — 85025 COMPLETE CBC W/AUTO DIFF WBC: CPT | Performed by: INTERNAL MEDICINE

## 2019-10-02 PROCEDURE — 84702 CHORIONIC GONADOTROPIN TEST: CPT | Performed by: INTERNAL MEDICINE

## 2019-10-02 PROCEDURE — 36415 COLL VENOUS BLD VENIPUNCTURE: CPT | Performed by: INTERNAL MEDICINE

## 2019-10-03 ENCOUNTER — APPOINTMENT (EMERGENCY)
Dept: CT IMAGING | Facility: HOSPITAL | Age: 39
End: 2019-10-03
Payer: COMMERCIAL

## 2019-10-03 ENCOUNTER — ANESTHESIA (OUTPATIENT)
Dept: PERIOP | Facility: HOSPITAL | Age: 39
End: 2019-10-03
Payer: COMMERCIAL

## 2019-10-03 ENCOUNTER — ANESTHESIA EVENT (OUTPATIENT)
Dept: PERIOP | Facility: HOSPITAL | Age: 39
End: 2019-10-03
Payer: COMMERCIAL

## 2019-10-03 ENCOUNTER — HOSPITAL ENCOUNTER (OUTPATIENT)
Facility: HOSPITAL | Age: 39
Setting detail: OBSERVATION
LOS: 1 days | Discharge: HOME | End: 2019-10-03
Attending: OBSTETRICS & GYNECOLOGY | Admitting: OBSTETRICS & GYNECOLOGY
Payer: COMMERCIAL

## 2019-10-03 VITALS
SYSTOLIC BLOOD PRESSURE: 168 MMHG | TEMPERATURE: 98 F | DIASTOLIC BLOOD PRESSURE: 71 MMHG | HEART RATE: 66 BPM | HEIGHT: 66 IN | RESPIRATION RATE: 18 BRPM | BODY MASS INDEX: 16.71 KG/M2 | OXYGEN SATURATION: 100 % | WEIGHT: 104 LBS

## 2019-10-03 VITALS
DIASTOLIC BLOOD PRESSURE: 65 MMHG | OXYGEN SATURATION: 99 % | RESPIRATION RATE: 16 BRPM | SYSTOLIC BLOOD PRESSURE: 110 MMHG | HEART RATE: 82 BPM | TEMPERATURE: 97.5 F

## 2019-10-03 DIAGNOSIS — N99.820 POSTOPERATIVE VAGINAL BLEEDING FOLLOWING GENITOURINARY PROCEDURE: Primary | ICD-10-CM

## 2019-10-03 LAB
ABO GROUP BLD: NORMAL
ALBUMIN SERPL BCP-MCNC: 4.1 G/DL (ref 3.5–5.7)
ALP SERPL-CCNC: 67 U/L (ref 40–150)
ALT SERPL W P-5'-P-CCNC: 17 U/L (ref 7–52)
ANION GAP SERPL CALCULATED.3IONS-SCNC: 5 MMOL/L (ref 4–13)
APTT PPP: 31 SECONDS (ref 23–37)
AST SERPL W P-5'-P-CCNC: 11 U/L (ref 13–39)
B-HCG SERPL-ACNC: <0.6 MIU/ML (ref 0–11.6)
BASOPHILS # BLD AUTO: 0.1 THOUSANDS/ΜL (ref 0–0.1)
BASOPHILS NFR BLD AUTO: 1 % (ref 0–2)
BILIRUB SERPL-MCNC: 0.3 MG/DL (ref 0.2–1)
BLD GP AB SCN SERPL QL: NEGATIVE
BUN SERPL-MCNC: 18 MG/DL (ref 7–25)
CALCIUM SERPL-MCNC: 9.4 MG/DL (ref 8.6–10.5)
CHLORIDE SERPL-SCNC: 105 MMOL/L (ref 98–107)
CO2 SERPL-SCNC: 27 MMOL/L (ref 21–31)
CREAT SERPL-MCNC: 0.83 MG/DL (ref 0.6–1.2)
EOSINOPHIL # BLD AUTO: 0.1 THOUSAND/ΜL (ref 0–0.61)
EOSINOPHIL NFR BLD AUTO: 2 % (ref 0–5)
ERYTHROCYTE [DISTWIDTH] IN BLOOD BY AUTOMATED COUNT: 12.9 % (ref 11.6–15.1)
ERYTHROCYTE [DISTWIDTH] IN BLOOD BY AUTOMATED COUNT: 13.6 % (ref 11.5–14.5)
GFR SERPL CREATININE-BSD FRML MDRD: 88 ML/MIN/1.73SQ M
GLUCOSE SERPL-MCNC: 110 MG/DL (ref 65–99)
HCT VFR BLD AUTO: 30.6 % (ref 34.8–46.1)
HCT VFR BLD AUTO: 38.8 % (ref 42–47)
HGB BLD-MCNC: 12.9 G/DL (ref 12–16)
HGB BLD-MCNC: 9.7 G/DL (ref 11.5–15.4)
INR PPP: 1.08 (ref 0.84–1.19)
LIPASE SERPL-CCNC: 66 U/L (ref 11–82)
LYMPHOCYTES # BLD AUTO: 1.2 THOUSANDS/ΜL (ref 0.6–4.47)
LYMPHOCYTES NFR BLD AUTO: 14 % (ref 21–51)
MCH RBC QN AUTO: 30.2 PG (ref 26.8–34.3)
MCH RBC QN AUTO: 30.7 PG (ref 26–34)
MCHC RBC AUTO-ENTMCNC: 31.7 G/DL (ref 31.4–37.4)
MCHC RBC AUTO-ENTMCNC: 33.2 G/DL (ref 31–37)
MCV RBC AUTO: 93 FL (ref 81–99)
MCV RBC AUTO: 95 FL (ref 82–98)
MONOCYTES # BLD AUTO: 0.7 THOUSAND/ΜL (ref 0.17–1.22)
MONOCYTES NFR BLD AUTO: 9 % (ref 2–12)
NEUTROPHILS # BLD AUTO: 6.4 THOUSANDS/ΜL (ref 1.4–6.5)
NEUTS SEG NFR BLD AUTO: 75 % (ref 42–75)
PLATELET # BLD AUTO: 231 THOUSANDS/UL (ref 149–390)
PLATELET # BLD AUTO: 285 THOUSANDS/UL (ref 149–390)
PMV BLD AUTO: 11.1 FL (ref 8.9–12.7)
PMV BLD AUTO: 9.7 FL (ref 8.6–11.7)
POTASSIUM SERPL-SCNC: 4.1 MMOL/L (ref 3.5–5.5)
PROT SERPL-MCNC: 6.7 G/DL (ref 6.4–8.9)
PROTHROMBIN TIME: 14.1 SECONDS (ref 11.6–14.5)
RBC # BLD AUTO: 3.21 MILLION/UL (ref 3.81–5.12)
RBC # BLD AUTO: 4.2 MILLION/UL (ref 3.9–5.2)
RH BLD: NEGATIVE
SODIUM SERPL-SCNC: 137 MMOL/L (ref 134–143)
SPECIMEN EXPIRATION DATE: NORMAL
WBC # BLD AUTO: 5.57 THOUSAND/UL (ref 4.31–10.16)
WBC # BLD AUTO: 8.5 THOUSAND/UL (ref 4.8–10.8)

## 2019-10-03 PROCEDURE — 85027 COMPLETE CBC AUTOMATED: CPT | Performed by: OBSTETRICS & GYNECOLOGY

## 2019-10-03 PROCEDURE — 99225 PR SBSQ OBSERVATION CARE/DAY 25 MINUTES: CPT | Performed by: OBSTETRICS & GYNECOLOGY

## 2019-10-03 PROCEDURE — 96360 HYDRATION IV INFUSION INIT: CPT

## 2019-10-03 PROCEDURE — 80053 COMPREHEN METABOLIC PANEL: CPT | Performed by: INTERNAL MEDICINE

## 2019-10-03 PROCEDURE — 57720 REVISION OF CERVIX: CPT | Performed by: OBSTETRICS & GYNECOLOGY

## 2019-10-03 PROCEDURE — 86920 COMPATIBILITY TEST SPIN: CPT

## 2019-10-03 PROCEDURE — 96361 HYDRATE IV INFUSION ADD-ON: CPT

## 2019-10-03 PROCEDURE — 74177 CT ABD & PELVIS W/CONTRAST: CPT

## 2019-10-03 PROCEDURE — 36415 COLL VENOUS BLD VENIPUNCTURE: CPT | Performed by: INTERNAL MEDICINE

## 2019-10-03 PROCEDURE — 85730 THROMBOPLASTIN TIME PARTIAL: CPT | Performed by: OBSTETRICS & GYNECOLOGY

## 2019-10-03 PROCEDURE — 86900 BLOOD TYPING SEROLOGIC ABO: CPT | Performed by: OBSTETRICS & GYNECOLOGY

## 2019-10-03 PROCEDURE — 85610 PROTHROMBIN TIME: CPT | Performed by: OBSTETRICS & GYNECOLOGY

## 2019-10-03 PROCEDURE — 86850 RBC ANTIBODY SCREEN: CPT | Performed by: OBSTETRICS & GYNECOLOGY

## 2019-10-03 PROCEDURE — NC001 PR NO CHARGE: Performed by: OBSTETRICS & GYNECOLOGY

## 2019-10-03 PROCEDURE — 86901 BLOOD TYPING SEROLOGIC RH(D): CPT | Performed by: OBSTETRICS & GYNECOLOGY

## 2019-10-03 PROCEDURE — 72191 CT ANGIOGRAPH PELV W/O&W/DYE: CPT

## 2019-10-03 PROCEDURE — 83690 ASSAY OF LIPASE: CPT | Performed by: INTERNAL MEDICINE

## 2019-10-03 RX ORDER — FENTANYL CITRATE 50 UG/ML
INJECTION, SOLUTION INTRAMUSCULAR; INTRAVENOUS AS NEEDED
Status: DISCONTINUED | OUTPATIENT
Start: 2019-10-03 | End: 2019-10-03 | Stop reason: SURG

## 2019-10-03 RX ORDER — OXYCODONE HYDROCHLORIDE 5 MG/1
5 TABLET ORAL EVERY 4 HOURS PRN
Status: DISCONTINUED | OUTPATIENT
Start: 2019-10-03 | End: 2019-10-03 | Stop reason: HOSPADM

## 2019-10-03 RX ORDER — MEPERIDINE HYDROCHLORIDE 50 MG/ML
12.5 INJECTION INTRAMUSCULAR; INTRAVENOUS; SUBCUTANEOUS ONCE AS NEEDED
Status: DISCONTINUED | OUTPATIENT
Start: 2019-10-03 | End: 2019-10-03 | Stop reason: HOSPADM

## 2019-10-03 RX ORDER — SUCCINYLCHOLINE/SOD CL,ISO/PF 100 MG/5ML
SYRINGE (ML) INTRAVENOUS AS NEEDED
Status: DISCONTINUED | OUTPATIENT
Start: 2019-10-03 | End: 2019-10-03 | Stop reason: SURG

## 2019-10-03 RX ORDER — SODIUM CHLORIDE 9 MG/ML
INJECTION, SOLUTION INTRAVENOUS CONTINUOUS PRN
Status: DISCONTINUED | OUTPATIENT
Start: 2019-10-03 | End: 2019-10-03 | Stop reason: SURG

## 2019-10-03 RX ORDER — SODIUM CHLORIDE 9 MG/ML
125 INJECTION, SOLUTION INTRAVENOUS CONTINUOUS
Status: DISCONTINUED | OUTPATIENT
Start: 2019-10-03 | End: 2019-10-03 | Stop reason: HOSPADM

## 2019-10-03 RX ORDER — KETOROLAC TROMETHAMINE 30 MG/ML
INJECTION, SOLUTION INTRAMUSCULAR; INTRAVENOUS AS NEEDED
Status: DISCONTINUED | OUTPATIENT
Start: 2019-10-03 | End: 2019-10-03 | Stop reason: SURG

## 2019-10-03 RX ORDER — IBUPROFEN 600 MG/1
600 TABLET ORAL EVERY 6 HOURS SCHEDULED
Status: DISCONTINUED | OUTPATIENT
Start: 2019-10-03 | End: 2019-10-03 | Stop reason: HOSPADM

## 2019-10-03 RX ORDER — ONDANSETRON 2 MG/ML
4 INJECTION INTRAMUSCULAR; INTRAVENOUS EVERY 6 HOURS PRN
Status: DISCONTINUED | OUTPATIENT
Start: 2019-10-03 | End: 2019-10-03 | Stop reason: HOSPADM

## 2019-10-03 RX ORDER — ONDANSETRON 2 MG/ML
4 INJECTION INTRAMUSCULAR; INTRAVENOUS ONCE AS NEEDED
Status: DISCONTINUED | OUTPATIENT
Start: 2019-10-03 | End: 2019-10-03 | Stop reason: HOSPADM

## 2019-10-03 RX ORDER — ACETAMINOPHEN 325 MG/1
650 TABLET ORAL EVERY 6 HOURS SCHEDULED
Status: DISCONTINUED | OUTPATIENT
Start: 2019-10-03 | End: 2019-10-03 | Stop reason: HOSPADM

## 2019-10-03 RX ORDER — MAGNESIUM HYDROXIDE 1200 MG/15ML
LIQUID ORAL AS NEEDED
Status: DISCONTINUED | OUTPATIENT
Start: 2019-10-03 | End: 2019-10-03 | Stop reason: HOSPADM

## 2019-10-03 RX ORDER — LIDOCAINE HYDROCHLORIDE 20 MG/ML
INJECTION, SOLUTION EPIDURAL; INFILTRATION; INTRACAUDAL; PERINEURAL AS NEEDED
Status: DISCONTINUED | OUTPATIENT
Start: 2019-10-03 | End: 2019-10-03 | Stop reason: SURG

## 2019-10-03 RX ORDER — FERRIC SUBSULFATE 0.21 G/G
LIQUID TOPICAL AS NEEDED
Status: DISCONTINUED | OUTPATIENT
Start: 2019-10-03 | End: 2019-10-03 | Stop reason: HOSPADM

## 2019-10-03 RX ORDER — FENTANYL CITRATE/PF 50 MCG/ML
50 SYRINGE (ML) INJECTION
Status: DISCONTINUED | OUTPATIENT
Start: 2019-10-03 | End: 2019-10-03 | Stop reason: HOSPADM

## 2019-10-03 RX ORDER — PROPOFOL 10 MG/ML
INJECTION, EMULSION INTRAVENOUS AS NEEDED
Status: DISCONTINUED | OUTPATIENT
Start: 2019-10-03 | End: 2019-10-03 | Stop reason: SURG

## 2019-10-03 RX ORDER — OXYCODONE HYDROCHLORIDE 10 MG/1
10 TABLET ORAL EVERY 4 HOURS PRN
Status: DISCONTINUED | OUTPATIENT
Start: 2019-10-03 | End: 2019-10-03 | Stop reason: HOSPADM

## 2019-10-03 RX ORDER — SODIUM CHLORIDE, SODIUM LACTATE, POTASSIUM CHLORIDE, CALCIUM CHLORIDE 600; 310; 30; 20 MG/100ML; MG/100ML; MG/100ML; MG/100ML
125 INJECTION, SOLUTION INTRAVENOUS CONTINUOUS
Status: DISCONTINUED | OUTPATIENT
Start: 2019-10-03 | End: 2019-10-03 | Stop reason: HOSPADM

## 2019-10-03 RX ORDER — HYDROMORPHONE HCL/PF 1 MG/ML
0.5 SYRINGE (ML) INJECTION
Status: DISCONTINUED | OUTPATIENT
Start: 2019-10-03 | End: 2019-10-03 | Stop reason: HOSPADM

## 2019-10-03 RX ORDER — ONDANSETRON 2 MG/ML
INJECTION INTRAMUSCULAR; INTRAVENOUS AS NEEDED
Status: DISCONTINUED | OUTPATIENT
Start: 2019-10-03 | End: 2019-10-03 | Stop reason: SURG

## 2019-10-03 RX ORDER — MIDAZOLAM HYDROCHLORIDE 1 MG/ML
INJECTION INTRAMUSCULAR; INTRAVENOUS AS NEEDED
Status: DISCONTINUED | OUTPATIENT
Start: 2019-10-03 | End: 2019-10-03 | Stop reason: SURG

## 2019-10-03 RX ADMIN — IOHEXOL 100 ML: 350 INJECTION, SOLUTION INTRAVENOUS at 02:06

## 2019-10-03 RX ADMIN — MIDAZOLAM 2 MG: 1 INJECTION INTRAMUSCULAR; INTRAVENOUS at 11:17

## 2019-10-03 RX ADMIN — ACETAMINOPHEN 650 MG: 325 TABLET ORAL at 13:36

## 2019-10-03 RX ADMIN — FENTANYL CITRATE 50 MCG: 50 INJECTION INTRAMUSCULAR; INTRAVENOUS at 12:36

## 2019-10-03 RX ADMIN — IBUPROFEN 600 MG: 600 TABLET ORAL at 13:36

## 2019-10-03 RX ADMIN — ONDANSETRON 4 MG: 2 INJECTION INTRAMUSCULAR; INTRAVENOUS at 12:04

## 2019-10-03 RX ADMIN — SODIUM CHLORIDE 1000 ML: 0.9 INJECTION, SOLUTION INTRAVENOUS at 03:45

## 2019-10-03 RX ADMIN — SODIUM CHLORIDE 125 ML/HR: 0.9 INJECTION, SOLUTION INTRAVENOUS at 04:36

## 2019-10-03 RX ADMIN — SODIUM CHLORIDE 125 ML/HR: 0.9 INJECTION, SOLUTION INTRAVENOUS at 10:45

## 2019-10-03 RX ADMIN — KETOROLAC TROMETHAMINE 30 MG: 30 INJECTION, SOLUTION INTRAMUSCULAR at 12:04

## 2019-10-03 RX ADMIN — LIDOCAINE HYDROCHLORIDE 3 ML: 20 INJECTION, SOLUTION EPIDURAL; INFILTRATION; INTRACAUDAL; PERINEURAL at 11:17

## 2019-10-03 RX ADMIN — SODIUM CHLORIDE: 0.9 INJECTION, SOLUTION INTRAVENOUS at 10:58

## 2019-10-03 RX ADMIN — FENTANYL CITRATE 100 MCG: 50 INJECTION, SOLUTION INTRAMUSCULAR; INTRAVENOUS at 11:17

## 2019-10-03 RX ADMIN — Medication 100 MG: at 11:17

## 2019-10-03 RX ADMIN — PROPOFOL 200 MG: 10 INJECTION, EMULSION INTRAVENOUS at 11:17

## 2019-10-03 RX ADMIN — IOHEXOL 100 ML: 350 INJECTION, SOLUTION INTRAVENOUS at 03:37

## 2019-10-03 RX ADMIN — FENTANYL CITRATE 50 MCG: 50 INJECTION INTRAMUSCULAR; INTRAVENOUS at 12:27

## 2019-10-03 RX ADMIN — SODIUM CHLORIDE: 0.9 INJECTION, SOLUTION INTRAVENOUS at 12:05

## 2019-10-03 NOTE — ED PROVIDER NOTES
History  Chief Complaint   Patient presents with    Post-op Problem     Pt had a LEEP procedure, started spotting on Friday; states gushing blood and clots with cramps     2YEAR-OLD FEMALE PRESENTS WITH VAGINAL BLEEDING WHICH STARTED ON SATURDAY  Patient is status post LEEP procedure done Monday September 23rd done at [facility]  Patient states everything was fine until about Saturday which she experienced some vaginal bleeding on the heavy side about 4 pads on Saturday and Sunday  She returned to work this week and Monday Tuesday again had which he describes is gushes of blood and saturating 3-4 pads per day  Today became nervous because she saturated 3-4 pads at work another 3 since she has been home from work  She spoke to her gyn who recommended her go to [facility] but the patient presented to the [facility] emergency room  She denies any intercourse since having a LEEP procedure  She states she has been following the directions taking Advil for her discomfort and bleeding  She is unsure if this is also supposed to be her regular menstrual cycle at this time  Prior to Admission Medications   Prescriptions Last Dose Informant Patient Reported?  Taking?   acetaminophen (TYLENOL) 650 mg CR tablet   No No   Sig: Take 1 tablet (650 mg total) by mouth every 8 (eight) hours as needed for mild pain   cyanocobalamin (VITAMIN B-12) 100 mcg tablet  Self Yes No   Sig: Take by mouth daily   ibuprofen (MOTRIN) 400 mg tablet   No No   Sig: Take 1 5 tablets (600 mg total) by mouth every 6 (six) hours as needed for mild pain or moderate pain   multivitamin (THERAGRAN) TABS  Self Yes No   Sig: Take 1 tablet by mouth daily   naproxen sodium (ALEVE) 220 MG tablet   Yes No   Sig: Take 220 mg by mouth as needed for mild pain      Facility-Administered Medications: None       Past Medical History:   Diagnosis Date    Abnormal Pap smear of cervix     Ovarian cyst     Left side    Wears glasses PRN       Past Surgical History:   Procedure Laterality Date    EXAMINATION UNDER ANESTHESIA N/A 10/3/2019    Procedure: EXAM UNDER ANESTHESIA (EUA), REPAIR OF CERVICAL LACERATION;  Surgeon: Abril Bashir MD;  Location: [facility];  Service: Gynecology    INDUCED       W[place] CONIZATION CERVIX,LOOP ELECTRD N/A 2019    Procedure: BIOPSY LEEP CERVIX;  Surgeon: Abril Bashir MD;  Location: [facility];  Service: Gynecology    WISDOM TOOTH EXTRACTION         Family History   Problem Relation Age of Onset    Stroke Mother     Diabetes Maternal Grandmother     Heart disease Paternal Grandfather      I have reviewed and agree with the history as documented  Social History     Tobacco Use    Smoking status: Never Smoker    Smokeless tobacco: Never Used   Substance Use Topics    Alcohol use: Not on file    Drug use: Never        Review of Systems   Constitutional: Negative  Respiratory: Negative  Cardiovascular: Negative  Gastrointestinal: Positive for abdominal pain  Endocrine: Negative  Genitourinary: Positive for menstrual problem and vaginal bleeding  Musculoskeletal: Negative  Skin: Negative  Neurological: Negative  Hematological: Negative  Psychiatric/Behavioral: The patient is nervous/anxious  Physical Exam  Physical Exam   Constitutional: She is oriented to person, place, and time  She appears well-developed and well-nourished  HENT:   Head: Normocephalic and atraumatic  Eyes: Pupils are equal, round, and reactive to light  Conjunctivae are normal    Neck: Normal range of motion  Neck supple  Cardiovascular: Normal rate, regular rhythm, normal heart sounds and intact distal pulses  Pulmonary/Chest: Effort normal and breath sounds normal    Abdominal: Soft  Bowel sounds are normal  There is tenderness  PATIENT IS TENDER IN THE LEFT LOWER QUADRANT SHE HAS NO REBOUND TENDERNESS OR GUARDING TENDERNESS IS MILD TO MODERATE WITH DEEP PALPATION    PULSES ARE INTACT THROUGHOUT  Neurological: She is alert and oriented to person, place, and time  Skin: Skin is warm and dry  Psychiatric: She has a normal mood and affect  Her behavior is normal    Vitals reviewed        Vital Signs  ED Triage Vitals [10/02/19 2324]   Temperature Pulse Respirations Blood Pressure SpO2   97 8 °F (36 6 °C) 66 18 149/88 99 %      Temp Source Heart Rate Source Patient Position - Orthostatic VS BP Location FiO2 (%)   Tympanic Monitor Sitting Left arm --      Pain Score       5           Vitals:    10/02/19 2324 10/03/19 0319 10/03/19 0653   BP: 149/88 149/93 168/71   Pulse: 66 57 66   Patient Position - Orthostatic VS: Sitting Lying Lying         Visual Acuity      ED Medications  Medications   iohexol (OMNIPAQUE) 350 MG/ML injection (SINGLE-DOSE) 100 mL (100 mL Intravenous Given 10/3/19 0206)   iohexol (OMNIPAQUE) 350 MG/ML injection (SINGLE-DOSE) 100 mL (100 mL Intravenous Given 10/3/19 0337)   sodium chloride 0 9 % bolus 1,000 mL (0 mL Intravenous Stopped 10/3/19 0431)       Diagnostic Studies  Results Reviewed     Procedure Component Value Units Date/Time    Lipase [[id number]]  (Normal) Collected:  10/03/19 0100    Lab Status:  Final result Specimen:  Blood from Arm, Right Updated:  10/03/19 0132     Lipase 66 u/L     Comprehensive metabolic panel [[id number]]  (Abnormal) Collected:  10/03/19 0100    Lab Status:  Final result Specimen:  Blood from Arm, Right Updated:  10/03/19 0132     Sodium 137 mmol/L      Potassium 4 1 mmol/L      Chloride 105 mmol/L      CO2 27 mmol/L      ANION GAP 5 mmol/L      BUN 18 mg/dL      Creatinine 0 83 mg/dL      Glucose 110 mg/dL      Calcium 9 4 mg/dL      AST 11 U/L      ALT 17 U/L      Alkaline Phosphatase 67 U/L      Total Protein 6 7 g/dL      Albumin 4 1 g/dL      Total Bilirubin 0 30 mg/dL      eGFR 88 ml/min/1 73sq m     Narrative:       Zev guidelines for Chronic Kidney Disease (CKD):     Stage 1 with normal or high GFR (GFR > 90 mL/min/1 73 square meters)    Stage 2 Mild CKD (GFR = 60-89 mL/min/1 73 square meters)    Stage 3A Moderate CKD (GFR = 45-59 mL/min/1 73 square meters)    Stage 3B Moderate CKD (GFR = 30-44 mL/min/1 73 square meters)    Stage 4 Severe CKD (GFR = 15-29 mL/min/1 73 square meters)    Stage 5 End Stage CKD (GFR <15 mL/min/1 73 square meters)  Note: GFR calculation is accurate only with a steady state creatinine    hCG, quantitative [[id number]]  (Normal) Collected:  10/02/19 2359    Lab Status:  Final result Specimen:  Blood from Arm, Right Updated:  10/03/19 0035     HCG, Quant <0 6 mIU/mL     Narrative:        Expected Ranges:     Approximate               Approximate HCG  Gestation age          Concentration ( mIU/mL)  _____________          ______________________   [facility]                      HCG values  0 2-1                       5-50  1-2                           2-3                         100-5000  3-4                         500-88420  4-5                         1000-77669  5-6                         [id number]  6-8                         [id number]  8-12                        [id number]      CBC and differential [[id number]]  (Abnormal) Collected:  10/02/19 2359    Lab Status:  Final result Specimen:  Blood from Arm, Right Updated:  10/03/19 0014     WBC 8 50 Thousand/uL      RBC 4 20 Million/uL      Hemoglobin 12 9 g/dL      Hematocrit 38 8 %      MCV 93 fL      MCH 30 7 pg      MCHC 33 2 g/dL      RDW 13 6 %      MPV 9 7 fL      Platelets 503 Thousands/uL      Neutrophils Relative 75 %      Lymphocytes Relative 14 %      Monocytes Relative 9 %      Eosinophils Relative 2 %      Basophils Relative 1 %      Neutrophils Absolute 6 40 Thousands/µL      Lymphocytes Absolute 1 20 Thousands/µL      Monocytes Absolute 0 70 Thousand/µL      Eosinophils Absolute 0 10 Thousand/µL      Basophils Absolute 0 10 Thousands/µL                  CTA pelvis w wo contrast   ED Interpretation by Simone Joshi MD (10/03 1984)   Findings consistent with active contrast extravasation into the vaginal vault, possibly in association with an irregular pseudoaneurysm near the expected location anterior cervix    Findings discussed with Dr Sveta Christensen at 4:24 AM, 10/3/2019      Workstation performed: [id number]         Final Result by Katty Corbin MD (10/03 0424)   Findings consistent with active contrast extravasation into the vaginal vault, possibly in association with an irregular pseudoaneurysm near the expected location anterior cervix    Findings discussed with Dr Sveta Christensen at 4:24 AM, 10/3/2019      Workstation performed: [id number]         CT abdomen pelvis with contrast   Final Result by Katty Corbin MD (10/03 0239)      Hyperdense foci along the expected location of the anterior cervix concerning for contrast blush, which in the context of the patient's recent LEEP procedure, may indicate pseudoaneurysm or extravasation  Findings were discussed with Dr Sveta Christensen at 2:39 AM, 10/3/2019              Workstation performed: [id number]                    Procedures  Procedures       ED Course  ED Course as of Oct 08 0640   Thu Oct 03, 2019   0450 CTA pelvis w wo contrast                               MDM    Disposition  Final diagnoses:   Extravasation of blood   Vaginal bleeding     Time reflects when diagnosis was documented in both MDM as applicable and the Disposition within this note     Time User Action Codes Description Comment    10/3/2019  5:22 AM Romana Jain Add [R58] Extravasation of blood     10/3/2019  5:22 AM Romana Jain Add [N93 9] Vaginal bleeding       ED Disposition     ED Disposition Condition Date/Time Comment    Transfer to Another Facility-In Network  Thu Oct 3, 2019  5:20 AM Homer Luke should be transferred out to [facility] DARWIN HERNANDEZ Documentation      Most Recent Value   Patient Condition  The patient has been stabilized such that within reasonable medical probability, no material deterioration of the patient condition or the condition of the unborn child(evangelista) is likely to result from the transfer, An emergency transfer is being made prior to stabilization due to the need for definitive care and the benefit of transfer outweighs the risk   Reason for Transfer  Level of Care needed not available at this facility   Benefits of Transfer  Specialized equipment and/or services available at the receiving facility (Include comment)________________________   Risks of Transfer  Potential for delay in receiving treatment, Potential deterioration of medical condition, Loss of IV, Increased discomfort during transfer, Possible worsening of condition or death during transfer   Accepting Physician  [address] Name, [address] by (Company and Unit #)  Codi Moreno RN Documentation      Most Recent Value   Accepting Facility Name, [facility]   Bed Assignment  558   Transported by (Company and Unit #)  [facility]   Level of Care  Advanced life support      Follow-up Information    None         Discharge Medication List as of 10/3/2019  7:24 AM      CONTINUE these medications which have NOT CHANGED    Details   acetaminophen (TYLENOL) 650 mg CR tablet Take 1 tablet (650 mg total) by mouth every 8 (eight) hours as needed for mild pain, Starting Mon 9/23/2019, Normal      cyanocobalamin (VITAMIN B-12) 100 mcg tablet Take by mouth daily, Historical Med      ibuprofen (MOTRIN) 400 mg tablet Take 1 5 tablets (600 mg total) by mouth every 6 (six) hours as needed for mild pain or moderate pain, Starting Mon 9/23/2019, Normal      multivitamin (THERAGRAN) TABS Take 1 tablet by mouth daily, Historical Med      naproxen sodium (ALEVE) 220 MG tablet Take 220 mg by mouth as needed for mild pain, Historical Med           No discharge procedures on file     ED Provider  Electronically Signed by           Heidi Ramsey MD  10/03/19 6986       Heidi Ramsey MD  10/08/19 9165

## 2019-10-03 NOTE — ANESTHESIA PREPROCEDURE EVALUATION
Review of Systems/Medical History  Patient summary reviewed  Chart reviewed  No history of anesthetic complications     Cardiovascular  Negative cardio ROS    Pulmonary  Negative pulmonary ROS        GI/Hepatic  Negative GI/hepatic ROS          Negative  ROS        Endo/Other  Negative endo/other ROS      GYN      Comment: Bleeding    S/p LEEP      Hematology  Negative hematology ROS      Musculoskeletal  Negative musculoskeletal ROS        Neurology  Negative neurology ROS      Psychology   Negative psychology ROS              Physical Exam    Airway    Mallampati score: II  TM Distance: <3 FB  Neck ROM: full     Dental   No notable dental hx     Cardiovascular  Comment: Negative ROS, Rhythm: regular, Rate: normal, Cardiovascular exam normal    Pulmonary  Pulmonary exam normal Breath sounds clear to auscultation,     Other Findings        Anesthesia Plan  ASA Score- 2 Emergent    Anesthesia Type- general with ASA Monitors  Additional Monitors:   Airway Plan: ETT  Plan Factors-Patient not instructed to abstain from smoking on day of procedure  Patient did not smoke on day of surgery  Induction- intravenous  Postoperative Plan-     Informed Consent- Anesthetic plan and risks discussed with patient

## 2019-10-03 NOTE — PLAN OF CARE
Problem: PAIN - ADULT  Goal: Verbalizes/displays adequate comfort level or baseline comfort level  Description  Interventions:  - Encourage patient to monitor pain and request assistance  - Assess pain using appropriate pain scale  - Administer analgesics based on type and severity of pain and evaluate response  - Implement non-pharmacological measures as appropriate and evaluate response  - Consider cultural and social influences on pain and pain management  - Notify physician/advanced practitioner if interventions unsuccessful or patient reports new pain  Outcome: Progressing     Problem: INFECTION - ADULT  Goal: Absence or prevention of progression during hospitalization  Description  INTERVENTIONS:  - Assess and monitor for signs and symptoms of infection  - Monitor lab/diagnostic results  - Monitor all insertion sites, i e  indwelling lines, tubes, and drains  - Monitor endotracheal if appropriate and nasal secretions for changes in amount and color  - [place] appropriate cooling/warming therapies per order  - Administer medications as ordered  - Instruct and encourage patient and family to use good hand hygiene technique  - Identify and instruct in appropriate isolation precautions for identified infection/condition  Outcome: Progressing  Goal: Absence of fever/infection during neutropenic period  Description  INTERVENTIONS:  - Monitor WBC    Outcome: Progressing     Problem: SAFETY ADULT  Goal: Patient will remain free of falls  Description  INTERVENTIONS:  - Assess patient frequently for physical needs  -  Identify cognitive and physical deficits and behaviors that affect risk of falls    -  [place] fall precautions as indicated by assessment   - Educate patient/family on patient safety including physical limitations  - Instruct patient to call for assistance with activity based on assessment  - Modify environment to reduce risk of injury  - Consider OT/PT consult to assist with strengthening/mobility  Outcome: Progressing  Goal: Maintain or return to baseline ADL function  Description  INTERVENTIONS:  -  Assess patient's ability to carry out ADLs; assess patient's baseline for ADL function and identify physical deficits which impact ability to perform ADLs (bathing, care of mouth/teeth, toileting, grooming, dressing, etc )  - Assess/evaluate cause of self-care deficits   - Assess range of motion  - Assess patient's mobility; develop plan if impaired  - Assess patient's need for assistive devices and provide as appropriate  - Encourage maximum independence but intervene and supervise when necessary  - Involve family in performance of ADLs  - Assess for home care needs following discharge   - Consider OT consult to assist with ADL evaluation and planning for discharge  - Provide patient education as appropriate  Outcome: Progressing  Goal: Maintain or return mobility status to optimal level  Description  INTERVENTIONS:  - Assess patient's baseline mobility status (ambulation, transfers, stairs, etc )    - Identify cognitive and physical deficits and behaviors that affect mobility  - Identify mobility aids required to assist with transfers and/or ambulation (gait belt, sit-to-stand, lift, walker, cane, etc )  - [place] fall precautions as indicated by assessment  - Record patient progress and toleration of activity level on Mobility SBAR; progress patient to next Phase/Stage  - Instruct patient to call for assistance with activity based on assessment  - Consider rehabilitation consult to assist with strengthening/weightbearing, etc   Outcome: Progressing     Problem: DISCHARGE PLANNING  Goal: Discharge to home or other facility with appropriate resources  Description  INTERVENTIONS:  - Identify barriers to discharge w/patient and caregiver  - Arrange for needed discharge resources and transportation as appropriate  - Identify discharge learning needs (meds, wound care, etc )  - Arrange for interpretive services to assist at discharge as needed  - Refer to Case Management Department for coordinating discharge planning if the patient needs post-hospital services based on physician/advanced practitioner order or complex needs related to functional status, cognitive ability, or social support system  Outcome: Progressing     Problem: Knowledge Deficit  Goal: Patient/family/caregiver demonstrates understanding of disease process, treatment plan, medications, and discharge instructions  Description  Complete learning assessment and assess knowledge base    Interventions:  - Provide teaching at level of understanding  - Provide teaching via preferred learning methods  Outcome: Progressing     Problem: HEMATOLOGIC - ADULT  Goal: Maintains hematologic stability  Description  INTERVENTIONS  - Assess for signs and symptoms of bleeding or hemorrhage  - Monitor labs  - Administer supportive blood products/factors as ordered and appropriate  Outcome: Progressing

## 2019-10-03 NOTE — PROGRESS NOTES
Patient evaluated for possible discharge  She feels tired from the blood loss and anesthesia but is ready to go home  Her pad is dry and she has felt no gushes since surgery  She was able to eat lunch without nausea or vomiting  Her mom is here - will drive her home      D/w Dr Lucien Sweeney MD  OBGYN, PGY-3  10/3/2019  3:33 PM

## 2019-10-03 NOTE — H&P
H&P Exam - Gynecology   Bay Morales 35 y o  female MRN: [id number]  Unit/Bed#: E5 -01 Encounter: [id number]      History of Present Illness     HPI:  Bay Morales is a 35 y o  female POD #10 (19) from a LEEP procedure for DENISE 2 presenting with heavy vaginal bleeding soaking a pad per hour for the last 3 days  Patient states that initially, she only had a small amount of spotting  She then had some bleeding over the weekend, which she attributed to her menstrual period  LMP 19  She did not have any cramps during that period  On Tuesday, she started noticing heavy vaginal bleeding with abundant clot production  States her menstrual periods are often light without clots and she has never experienced these clots before  She has been changing a pad every hour, soaking through each pad by the time it is changed  She denies nausea, vomiting, headache, fever, shortness of breath, chest pain, abdominal pain  She reports lower abdominal pain along with the vaginal bleeding  Review of Systems   Constitutional: Negative  HENT: Negative  Eyes: Negative  Respiratory: Negative  Cardiovascular: Negative  Gastrointestinal: Negative  Endocrine: Negative  Genitourinary: Positive for vaginal bleeding  Musculoskeletal: Negative  Historical Information   Past Medical History:   Diagnosis Date    Abnormal Pap smear of cervix     Ovarian cyst     Left side    Wears glasses     PRN     Past Surgical History:   Procedure Laterality Date    INDUCED       [place] CONIZATION CERVIX,LOOP ELECTRD N/A 2019    Procedure: BIOPSY LEEP CERVIX;  Surgeon:  Andres Castellano MD;  Location: [facility];  Service: Gynecology    WISDOM TOOTH EXTRACTION       OB/GYN History: None  Family History   Problem Relation Age of Onset    Stroke Mother     Diabetes Maternal Grandmother     Heart disease Paternal Grandfather      Social History   Social History     Substance and Sexual Activity   Alcohol Use Not on file     Social History     Substance and Sexual Activity   Drug Use Never     Social History     Tobacco Use   Smoking Status Never Smoker   Smokeless Tobacco Never Used       Meds/Allergies   Medications Prior to Admission   Medication    acetaminophen (TYLENOL) 650 mg CR tablet    cyanocobalamin (VITAMIN B-12) 100 mcg tablet    ibuprofen (MOTRIN) 400 mg tablet    multivitamin (THERAGRAN) TABS    naproxen sodium (ALEVE) 220 MG tablet     No Known Allergies    Objective   /84 (BP Location: Right arm)   Pulse 58   Temp 97 8 °F (36 6 °C) (Temporal)   Resp 16   LMP 09/02/2019   SpO2 100%     No intake or output data in the 24 hours ending 10/03/19 1029      Physical Exam   Constitutional: She is oriented to person, place, and time  She appears well-developed and well-nourished  Cardiovascular: Normal rate, regular rhythm and normal heart sounds  Pulmonary/Chest: Effort normal and breath sounds normal    Abdominal: Soft  Bowel sounds are normal    Genitourinary:   Genitourinary Comments: Normal appearing external genitalia  Upon placement of speculum, large amount of clots were visualized  25 proctoswabs and a ringed forcep were used to clear out clots - however, due to active bleeding, visualization of the cervix was unable to be obtained  EBL 300cc on alize pad  Decision was made to proceed to operating room for an exam under anesthesia  Neurological: She is alert and oriented to person, place, and time  Vitals reviewed      Lab Results:   Admission on 10/02/2019, Discharged on 10/03/2019   Component Date Value    WBC 10/02/2019 8 50     RBC 10/02/2019 4 20     Hemoglobin 10/02/2019 12 9     Hematocrit 10/02/2019 38 8*    MCV 10/02/2019 93     MCH 10/02/2019 30 7     MCHC 10/02/2019 33 2     RDW 10/02/2019 13 6     MPV 10/02/2019 9 7     Platelets 59/72/6182 285     Neutrophils Relative 10/02/2019 75     Lymphocytes Relative 10/02/2019 14*    Monocytes Relative 10/02/2019 9  Eosinophils Relative 10/02/2019 2     Basophils Relative 10/02/2019 1     Neutrophils Absolute 10/02/2019 6 40     Lymphocytes Absolute 10/02/2019 1 20     Monocytes Absolute 10/02/2019 0 70     Eosinophils Absolute 10/02/2019 0 10     Basophils Absolute 10/02/2019 0 10     Sodium 10/03/2019 137     Potassium 10/03/2019 4 1     Chloride 10/03/2019 105     CO2 10/03/2019 27     ANION GAP 10/03/2019 5     BUN 10/03/2019 18     Creatinine 10/03/2019 0 83     Glucose 10/03/2019 110*    Calcium 10/03/2019 9 4     AST 10/03/2019 11*    ALT 10/03/2019 17     Alkaline Phosphatase 10/03/2019 67     Total Protein 10/03/2019 6 7     Albumin 10/03/2019 4 1     Total Bilirubin 10/03/2019 0 30     eGFR 10/03/2019 88     Lipase 10/03/2019 66     HCG, Quant 10/02/2019 <0 6         Assessment/Plan     A/P: 41yo  female POD #10 from LEEP presenting with heavy vaginal bleeding    Heavy vaginal bleeding    Due to inadequate speculum exam in the room, will take patient back to operating room for exam under anesthesia and cautery of actively bleeding tissue  I spoke with Dr Karoline Prince of anesthesia who took a look at the CTA that showed active contrast extravasation into the vaginal vault, possibly in association with an irregular pseudoaneurysm near the expected location of the anterior cervix  Per IR, this appears to be a cervical bleed that can be cauterized under EUA  If we cannot do so, we will involve IR for embolization  Hgb 12 9g/dL, VSS  T&C for 2U pRBC  NPO status  To OR ASAP    Dr Sj Nickerson in room during evaluation  Dr Katiuska Knight (her OBGYN) will be present for the case         Code Status: No Order  Advance Directive and Living Will:      Power of :    POLST:      Kimberly Pavon MD  10/3/2019  10:29 AM

## 2019-10-03 NOTE — DISCHARGE INSTRUCTIONS
Gynecology Discharge Instructions  1  No heavy lifting more than one full gallon of milk (about 8 lbs)  2  Nothing in the vagina for 4-6 weeks and until cleared by Dr Katiuska Knight  3  No tub baths or swimming  4  You may take stairs one at a time, touching each step with both feet for the first few days, then as tolerated  5  Call the office for fever greater than 100 4, heavy vaginal bleeding or increasing pain  6  Take Colace twice daily to keep your stool soft  7  No Driving until pain free and no longer requiring narcotic pain medications  8  Activity as tolerated     Post Op Prescriptions  You may take over the counter Ibuprofen every 6 hours to relieve pain, especially cramping and mild pain      If you have any questions regarding your prescriptions please call your doctor

## 2019-10-03 NOTE — OP NOTE
OPERATIVE REPORT  PATIENT NAME: Uziel Narvaez    :  1980  MRN: [id number]  Pt Location: [facility] OR ROOM 07    SURGERY DATE: 10/3/2019    Surgeon(s) and Role:     * Cindy Salvador MD - Primary     * Javi Rene MD - Assisting     * Eda Miller MD - Observing    Preop Diagnosis:  Postoperative vaginal bleeding following genitourinary procedure [N99 820]    Post-Op Diagnosis Codes:     * Postoperative vaginal bleeding following genitourinary procedure [N99 820]    Procedure(s) (LRB):  EXAM UNDER ANESTHESIA (EUA), REPAIR OF CERVICAL LACERATION (N/A)    Specimen(s):  None    Estimated Blood Loss:   150cc    Drains:  None    Anesthesia Type:   General    Operative Indications:  Postoperative vaginal bleeding following genitourinary procedure [N99 820]    Operative Findings:  Normal appearing external genitalia  Large blood clots initially obscuring visualization of cervix  After clots were cleared, a large cervical laceration was noted from 6 o'clock to 12 o'clock position  After laceration repair, hemostasis was achieved        Complications:   None    Procedure and Technique:    Indications for the procedure:   Patient is a 35year old female POD #10 from LEEP who was admitted for heavy vaginal bleeding soaking through 1 pad per hour for the past 3 days  CT showed active contrast extravasation into the vaginal vault, possibly in association with an irregular pseudoaneurysm near the expected location of the anterior cervix  During physical exam in the patient's room, visualization of the deficit was difficult  Given presence of large blood clots and approximately 300cc blood clot on the alize, decision was made to proceed to operating room  Patient was consented for an exam under anesthesia and repair of cervical laceration  Description of the procedure: The patient was taken to the operating room  IV sedation was administered and found to be adequate   The patient was then positioned on the operating table in dorsolithotomy position with legs supported by stirrups and SCDs bilaterally  All pressure points were padded  Warm blanket was placed to maintain control of core body temperature  The patient was then prepped and draped in usual sterile fashion  Operative technique: A timeout was performed to confirm correct patient and correct procedure  The bladder was emptied with a straight catheter and 100 milliliters of clear yellow urine were obtained  A bivalved coated speculum was inserted into the vagina and the cervix was visualized  The large blood clots were suctioned out using the Sondanella 42  Findings as noted above  Bovie electrocautery was used to cauterize raw tissue near the external os  The bivalve speculum was removed from the vagina  A weighted speculum was placed  The cervix was sutured in a running locked fashion from the 12 o'clock to the 4 o'clock position in a counterclockwise fashion using 0- vicryl  A second layer of sutures was added from the 7 o'clock to 4 o'clock positions for added support  Hemostasis was noted  Monsel's was then applied to maintain hemostasis  Good hemostasis was confirmed  At the completion of the procedure all needle, sponge, instrument counts were noted to be correct ×2  Dr Macedo Patient was present for all key portions of the procedure      Patient Disposition:  PACU     SIGNATURE: Mulugeta Cruz MD  DATE: October 3, 2019  TIME: 12:15 PM

## 2019-10-04 ENCOUNTER — TELEPHONE (OUTPATIENT)
Dept: OBGYN CLINIC | Facility: CLINIC | Age: 39
End: 2019-10-04

## 2019-10-04 NOTE — TELEPHONE ENCOUNTER
Patient called  Patient reports she is doing okay  She has gone up and down stairs  She has been eating  Normal bowel movement  Patient reports feeling a little bit sore otherwise without complaints  Patient is requesting to be off of work until October 14th since she wants to really make sure everything is better before she starts back at work  She will call back to the office with a fax number for a note to be written  She will also make a postop checkat that time  Pathology report reviewed with patient  Discussed positive endocervical margin  Plan to repeat Pap and ECC in 3 months  All questions answered

## 2019-10-07 ENCOUNTER — TELEPHONE (OUTPATIENT)
Dept: OBGYN CLINIC | Facility: MEDICAL CENTER | Age: 39
End: 2019-10-07

## 2019-10-07 LAB
ABO GROUP BLD BPU: NORMAL
ABO GROUP BLD BPU: NORMAL
BPU ID: NORMAL
BPU ID: NORMAL
CROSSMATCH: NORMAL
CROSSMATCH: NORMAL
UNIT DISPENSE STATUS: NORMAL
UNIT DISPENSE STATUS: NORMAL
UNIT PRODUCT CODE: NORMAL
UNIT PRODUCT CODE: NORMAL
UNIT RH: NORMAL
UNIT RH: NORMAL

## 2020-01-17 ENCOUNTER — PROCEDURE VISIT (OUTPATIENT)
Dept: OBGYN CLINIC | Facility: CLINIC | Age: 40
End: 2020-01-17

## 2020-01-17 VITALS — SYSTOLIC BLOOD PRESSURE: 118 MMHG | WEIGHT: 112 LBS | BODY MASS INDEX: 18.08 KG/M2 | DIASTOLIC BLOOD PRESSURE: 72 MMHG

## 2020-01-17 DIAGNOSIS — Z09 POSTOP CHECK: Primary | ICD-10-CM

## 2020-01-17 PROCEDURE — 99024 POSTOP FOLLOW-UP VISIT: CPT | Performed by: OBSTETRICS & GYNECOLOGY

## 2020-01-17 NOTE — PROGRESS NOTES
Rebeca Goldmann was seen today for follow-up  Diagnoses and all orders for this visit:    Postop check         Plan  Patient is doing well  Patient was supposed to have a repeat Pap and ECC performed today  Patient declined due to her heavy menses  We have rescheduled her to come back to have the procedures done  Patient also reports she gets some occasional LLQ pain  Patient reports that if she were to need any further cervical procedures she would like to discuss a hysterectomy at that time  All questions answered  Subjective   Joaquim Hopkins is a 35 y o  female here for a postop visit  Patient had a LEEP on 19 with subsequent readmission for VB 10/3/19 at which time repair of her cervix had to be performed  Patient reports she is doing well since the procedure  No further bleeding issues  Patient is currently on the 2nd day of her menses and states that it is heavy  States she is eating more and that her weight is starting to go up  Reports some LLQ "twinges "    Patient Active Problem List   Diagnosis    DENISE II (cervical intraepithelial neoplasia II)    S/P LEEP (loop electrosurgical excision procedure)    Postoperative vaginal bleeding following genitourinary procedure       Gynecologic History  Patient's last menstrual period was 01/15/2020 (exact date)  The current method of family planning is none  Past Medical History:   Diagnosis Date    Abnormal Pap smear of cervix     Ovarian cyst     Left side    Wears glasses     PRN     Past Surgical History:   Procedure Laterality Date    EXAMINATION UNDER ANESTHESIA N/A 10/3/2019    Procedure: EXAM UNDER ANESTHESIA (EUA), REPAIR OF CERVICAL LACERATION;  Surgeon: Alex Pinzon MD;  [facility];  Service: Gynecology    INDUCED       [place] CONIZATION CERVIX,LOOP ELECTRD N/A 2019    Procedure: BIOPSY LEEP CERVIX;  Surgeon:  Alxe Pinzon MD;  [facility];  Service: Gynecology    WISDOM TOOTH EXTRACTION Family History   Problem Relation Age of Onset    Stroke Mother     Diabetes Maternal Grandmother     Heart disease Paternal Grandfather      Social History     Socioeconomic History    Marital status: Single     Spouse name: Not on file    Number of children: Not on file    Years of education: Not on file    Highest education level: Not on file   Occupational History    Not on file   Social Needs    Financial resource strain: Not on file    Food insecurity:     Worry: Not on file     Inability: Not on file    Transportation needs:     Medical: Not on file     Non-medical: Not on file   Tobacco Use    Smoking status: Never Smoker    Smokeless tobacco: Never Used   Substance and Sexual Activity    Alcohol use: Not on file    Drug use: Never    Sexual activity: Not Currently     Partners: Male     Birth control/protection: Condom Male   Lifestyle    Physical activity:     Days per week: Not on file     Minutes per session: Not on file    Stress: Not on file   Relationships    Social connections:     Talks on phone: Not on file     Gets together: Not on file     Attends [employer] service: Not on file     Active member of club or organization: Not on file     Attends meetings of clubs or organizations: Not on file     Relationship status: Not on file    Intimate partner violence:     Fear of current or ex partner: Not on file     Emotionally abused: Not on file     Physically abused: Not on file     Forced sexual activity: Not on file   Other Topics Concern    Not on file   Social History Narrative    Not on file     No Known Allergies    Current Outpatient Medications:     cyanocobalamin (VITAMIN B-12) 100 mcg tablet, Take by mouth daily, Disp: , Rfl:     multivitamin (THERAGRAN) TABS, Take 1 tablet by mouth daily, Disp: , Rfl:     naproxen sodium (ALEVE) 220 MG tablet, Take 220 mg by mouth as needed for mild pain, Disp: , Rfl:     acetaminophen (TYLENOL) 650 mg CR tablet, Take 1 tablet (650 mg total) by mouth every 8 (eight) hours as needed for mild pain (Patient not taking: Reported on 1/17/2020), Disp: 30 tablet, Rfl: 0    ibuprofen (MOTRIN) 400 mg tablet, Take 1 5 tablets (600 mg total) by mouth every 6 (six) hours as needed for mild pain or moderate pain (Patient not taking: Reported on 1/17/2020), Disp: 20 tablet, Rfl: 0    Review of Systems  Constitutional :no fever, feels well, no tiredness, no recent weight gain or loss  ENT: no ear ache, no loss of hearing, no nosebleeds or nasal discharge, no sore throat or hoarseness  Cardiovascular: no complaints of slow or fast heart beat, no chest pain, no palpitations, no leg claudication or lower extremity edema  Respiratory: no complaints of shortness of shortness of breath, no MATAMOROS  Breasts:no complaints of breast pain, breast lump, or nipple discharge  Gastrointestinal: no complaints of abdominal pain, constipation, nausea, vomiting, or diarrhea or bloody stools  Genitourinary : no complaints of dysuria, incontinence, pelvic pain, no dysmenorrhea, vaginal discharge or abnormal vaginal bleeding and as noted in HPI  Musculoskeletal: no complaints of arthralgia, no myalgia, no joint swelling or stiffness, no limb pain or swelling    Integumentary: no complaints of skin rash or lesion, itching or dry skin  Neurological: no complaints of headache, no confusion, no numbness or tingling, no dizziness or fainting     Objective     /72   Wt 50 8 kg (112 lb)   LMP 01/15/2020 (Exact Date)   BMI 18 08 kg/m²                        General: Appears stated age, cooperative, alert normal mood and affect               Psychiatric:  orientation to person, place and time: normal  mood and affect: normal

## 2020-02-06 ENCOUNTER — TELEPHONE (OUTPATIENT)
Dept: OBGYN CLINIC | Facility: MEDICAL CENTER | Age: 40
End: 2020-02-06

## 2020-02-06 NOTE — TELEPHONE ENCOUNTER
Attempted calling pt to schedule appt for 3/6 in [facility] pass with Dr Luciano for repap and ECC  Vm full unable to lm  When pt calls back please schedule appt

## 2020-03-06 ENCOUNTER — OFFICE VISIT (OUTPATIENT)
Dept: OBGYN CLINIC | Facility: CLINIC | Age: 40
End: 2020-03-06
Payer: COMMERCIAL

## 2020-03-06 VITALS — BODY MASS INDEX: 18.13 KG/M2 | DIASTOLIC BLOOD PRESSURE: 60 MMHG | SYSTOLIC BLOOD PRESSURE: 104 MMHG | WEIGHT: 112.3 LBS

## 2020-03-06 DIAGNOSIS — R10.32 LLQ PAIN: ICD-10-CM

## 2020-03-06 DIAGNOSIS — N87.1 CIN II (CERVICAL INTRAEPITHELIAL NEOPLASIA II): Primary | ICD-10-CM

## 2020-03-06 PROCEDURE — 87624 HPV HI-RISK TYP POOLED RSLT: CPT | Performed by: OBSTETRICS & GYNECOLOGY

## 2020-03-06 PROCEDURE — 88175 CYTOPATH C/V AUTO FLUID REDO: CPT | Performed by: OBSTETRICS & GYNECOLOGY

## 2020-03-06 PROCEDURE — 88305 TISSUE EXAM BY PATHOLOGIST: CPT | Performed by: PATHOLOGY

## 2020-03-06 PROCEDURE — 99213 OFFICE O/P EST LOW 20 MIN: CPT | Performed by: OBSTETRICS & GYNECOLOGY

## 2020-03-06 NOTE — PATIENT INSTRUCTIONS
Hysterectomy   WHAT YOU NEED TO KNOW:   What do I need to know about a hysterectomy? A hysterectomy is surgery to remove your uterus  Your ovaries, fallopian tubes, cervix, or part of your vagina may also need to be removed  The organs and tissue that will be removed depends on your medical condition  How do I prepare for a hysterectomy? Your healthcare provider will talk to you about how to prepare for surgery  He may tell you not to eat or drink anything after midnight on the day of your surgery  You will need to stop taking aspirin 7 to 10 days before your procedure  You will need to stop taking NSAIDs 3 days before you procedure  You will also need to stop taking certain herbal supplements 7 days before your procedure  These include garlic, gingko biloba, and ginseng  Your provider will tell you what medicines to take or not take on the day of your surgery  You will be given an antibiotic through your IV to help prevent a bacterial infection  Arrange for someone to drive you home and stay with you after surgery  What will happen during a hysterectomy? · You may be given general anesthesia to keep you asleep and free from pain during surgery  You may instead be given regional anesthesia to numb the lower part of your body  Your uterus may be removed through your vagina (vaginal hysterectomy) or through a an incision in your abdomen (abdominal hysterectomy)  It may also be done through several small incisions in your abdomen (laparoscopic hysterectomy)  During a laparoscopic hysterectomy, a laparoscope and other tools will be put into your abdomen through the small incisions  The laparoscope is a long metal tube with a light and camera on the end  Your abdomen will be filled with a gas  This allows your surgeon to see inside your abdomen more clearly  · Your surgeon will cut and tie the ligaments that hold your uterus in place   The blood vessels that go to your uterus will also be tied to help decrease bleeding  Your surgeon may also remove other organs or tissue such as your ovaries, fallopian tubes, cervix, lymph nodes, or part of your vagina  · Your surgeon may instead use robotic arms to place a laparoscope and other tools inside your abdomen through the incisions  He will use the machine to look inside your abdomen and guide the robotic arms  He will use the tools attached to the robotic arms to remove your uterus, cervix, or other tissues  · Any incisions that were made during surgery will be closed with stitches, staples, surgical glue, or surgical tape  The incisions may be covered with a bandage  A vaginal pack or sanitary pad may be used to absorb the bleeding  A vaginal pack is a special gauze that is inserted into the vagina  It is removed before you go home or to a hospital room  What will happen after a hysterectomy? You may have a catheter to help drain your bladder for up to 24 hours  You may also have pain in your shoulders or near your ribs if gas was put in your abdomen  You will have pain for the first few days after surgery  You will need to wear sanitary pads for vaginal bleeding that occurs after surgery  You will be asked to walk as soon as possible after surgery  This will help to prevent blood clots in your legs  You may need to stay in the hospital for 1 to 4 days after surgery  The length of time depends on the type of hysterectomy you had  What are the risks of a hysterectomy? · The surgeon may need to change the type of surgery he was planning to do  For example, he may need to change from a laparoscopic surgery to an open abdominal surgery  You will not be able to become pregnant after you have a hysterectomy  You will go through menopause if your ovaries are removed  · You may bleed more than expected or get an infection  Your bladder, ureters, or bowels may be damaged during surgery   If your ureters were injured, you may need a catheter to drain your bladder for several days to weeks  You may get scar tissue in your abdomen that blocks your intestine or causes pelvic pain  If you have a hysterectomy to treat cancer, this surgery may not take it away completely  There is also a chance that the cancer may return  You may get a blood clot in your leg or arm  This may become life-threatening  CARE AGREEMENT:   You have the right to help plan your care  Learn about your health condition and how it may be treated  Discuss treatment options with your caregivers to decide what care you want to receive  You always have the right to refuse treatment  The above information is an  only  It is not intended as medical advice for individual conditions or treatments  Talk to your doctor, nurse or pharmacist before following any medical regimen to see if it is safe and effective for you  © 2017 [address] Information is for End User's use only and may not be sold, redistributed or otherwise used for commercial purposes  All illustrations and images included in CareNotes® are the copyrighted property of A D [employer] M [employer]  or Amauri Aguilera  Thank you for your confidence in our team    We appreciate you and welcome your feedback  If you receive a survey from us, please take a few moments to let us know how we are doing     Sincerely,   Analia Edwards MD

## 2020-03-06 NOTE — PROGRESS NOTES
Robby Westbrook was seen today for follow-up  Diagnoses and all orders for this visit:    DENISE II (cervical intraepithelial neoplasia II)    LLQ pain       Plan  Pap and ECC taken today  Discussed options for management of left lower quadrant pain  Patient adamantly desires the surgical route and wants a TLH and LS0  Patient will contact our surgery scheduler to schedule  We will contact patient with all test results  All questions answered  Subjective   Maricel Link is a 35 y o  female here for a f/u visit  Patient is complaining of continued left lower quadrant pain, wants to discuss a hysterectomy  Patient reports the pain has been persistent for years now  Patient Active Problem List   Diagnosis    DENISE II (cervical intraepithelial neoplasia II)    S/P LEEP (loop electrosurgical excision procedure)    Postoperative vaginal bleeding following genitourinary procedure       Gynecologic History  Patient's last menstrual period was 2020 (within days)  The current method of family planning is none  Hasn't been sexually active in 4 months  Past Medical History:   Diagnosis Date    Abnormal Pap smear of cervix     Ovarian cyst     Left side    Wears glasses     PRN     Past Surgical History:   Procedure Laterality Date    EXAMINATION UNDER ANESTHESIA N/A 10/3/2019    Procedure: EXAM UNDER ANESTHESIA (EUA), REPAIR OF CERVICAL LACERATION;  Surgeon: Sandra Mccann MD;  [facility];  Service: Gynecology    INDUCED       [place] CONIZATION CERVIX,LOOP ELECTRD N/A 2019    Procedure: BIOPSY LEEP CERVIX;  Surgeon:  Sandra Mccann MD;  [facility];  Service: Gynecology    WISDOM TOOTH EXTRACTION       Family History   Problem Relation Age of Onset    Stroke Mother     Diabetes Maternal Grandmother     Heart disease Paternal Grandfather      Social History     Socioeconomic History    Marital status: Single     Spouse name: Not on file    Number of children: Not on file    Years of education: Not on file    Highest education level: Not on file   Occupational History    Not on file   Social Needs    Financial resource strain: Not on file    Food insecurity:     Worry: Not on file     Inability: Not on file    Transportation needs:     Medical: Not on file     Non-medical: Not on file   Tobacco Use    Smoking status: Never Smoker    Smokeless tobacco: Never Used   Substance and Sexual Activity    Alcohol use: Not on file    Drug use: Never    Sexual activity: Not Currently     Partners: Male     Birth control/protection: Condom Male   Lifestyle    Physical activity:     Days per week: Not on file     Minutes per session: Not on file    Stress: Not on file   Relationships    Social connections:     Talks on phone: Not on file     Gets together: Not on file     Attends Restoration service: Not on file     Active member of club or organization: Not on file     Attends meetings of clubs or organizations: Not on file     Relationship status: Not on file    Intimate partner violence:     Fear of current or ex partner: Not on file     Emotionally abused: Not on file     Physically abused: Not on file     Forced sexual activity: Not on file   Other Topics Concern    Not on file   Social History Narrative    Not on file     No Known Allergies    Current Outpatient Medications:     cyanocobalamin (VITAMIN B-12) 100 mcg tablet, Take by mouth daily, Disp: , Rfl:     multivitamin (THERAGRAN) TABS, Take 1 tablet by mouth daily, Disp: , Rfl:     naproxen sodium (ALEVE) 220 MG tablet, Take 220 mg by mouth as needed for mild pain, Disp: , Rfl:     acetaminophen (TYLENOL) 650 mg CR tablet, Take 1 tablet (650 mg total) by mouth every 8 (eight) hours as needed for mild pain (Patient not taking: Reported on 1/17/2020), Disp: 30 tablet, Rfl: 0    ibuprofen (MOTRIN) 400 mg tablet, Take 1 5 tablets (600 mg total) by mouth every 6 (six) hours as needed for mild pain or moderate pain (Patient not taking: Reported on 1/17/2020), Disp: 20 tablet, Rfl: 0    Review of Systems  Constitutional :no fever, feels well, no tiredness, no recent weight gain or loss  ENT: no ear ache, no loss of hearing, no nosebleeds or nasal discharge, no sore throat or hoarseness  Cardiovascular: no complaints of slow or fast heart beat, no chest pain, no palpitations, no leg claudication or lower extremity edema  Respiratory: no complaints of shortness of shortness of breath, no MATAMOROS  Breasts:no complaints of breast pain, breast lump, or nipple discharge  Gastrointestinal: no complaints of abdominal pain, constipation, nausea, vomiting, or diarrhea or bloody stools  Genitourinary : no complaints of dysuria, incontinence, +pelvic pain, no dysmenorrhea, vaginal discharge or abnormal vaginal bleeding and as noted in HPI  Musculoskeletal: no complaints of arthralgia, no myalgia, no joint swelling or stiffness, no limb pain or swelling  Integumentary: no complaints of skin rash or lesion, itching or dry skin  Neurological: no complaints of headache, no confusion, no numbness or tingling, no dizziness or fainting     Objective     /60   Wt 50 9 kg (112 lb 4 8 oz)   LMP 02/07/2020 (Within Days)   BMI 18 13 kg/m²     General Appears stated age, cooperative, alert normal mood and affect   Psychiatric oriented to person, place and time  Mood and affect normal   Vulva: normal , no lesions   Vagina: normal , no lesions or dryness   Urethra: normal   Urethal meatus normal   Bladder Normal, soft, non-tender and no prolapse or masses appreciated   Cervix: normal, no palpable masses, well-healed  ECC taken   Uterus: normal , mild tenderness to palpation, not enlarged, no palpable masses   Adnexa: R: normal, non-tender, L:  Moderate tenderness to palpation    No fullness or masses appreciated   Lymphatic Palpation of lymph nodes in neck, axilla, groin and/or other locations: no lymphadenopathy or masses noted   Skin Normal skin turgor and no rashes    Palpation of skin and subcutaneous tissue normal

## 2020-03-10 LAB
HPV HR 12 DNA CVX QL NAA+PROBE: NEGATIVE
HPV16 DNA CVX QL NAA+PROBE: NEGATIVE
HPV18 DNA CVX QL NAA+PROBE: NEGATIVE

## 2020-03-12 LAB
LAB AP GYN PRIMARY INTERPRETATION: NORMAL
Lab: NORMAL

## 2020-10-02 ENCOUNTER — OFFICE VISIT (OUTPATIENT)
Dept: OBGYN CLINIC | Facility: CLINIC | Age: 40
End: 2020-10-02
Payer: COMMERCIAL

## 2020-10-02 VITALS
HEIGHT: 66 IN | SYSTOLIC BLOOD PRESSURE: 110 MMHG | DIASTOLIC BLOOD PRESSURE: 70 MMHG | WEIGHT: 108 LBS | BODY MASS INDEX: 17.36 KG/M2

## 2020-10-02 DIAGNOSIS — N94.6 DYSMENORRHEA: ICD-10-CM

## 2020-10-02 DIAGNOSIS — N87.1 CIN II (CERVICAL INTRAEPITHELIAL NEOPLASIA II): ICD-10-CM

## 2020-10-02 DIAGNOSIS — R10.32 LLQ PAIN: Primary | ICD-10-CM

## 2020-10-02 PROBLEM — N99.820 POSTOPERATIVE VAGINAL BLEEDING FOLLOWING GENITOURINARY PROCEDURE: Status: RESOLVED | Noted: 2019-10-03 | Resolved: 2020-10-02

## 2020-10-02 PROCEDURE — G0145 SCR C/V CYTO,THINLAYER,RESCR: HCPCS | Performed by: OBSTETRICS & GYNECOLOGY

## 2020-10-02 PROCEDURE — 87624 HPV HI-RISK TYP POOLED RSLT: CPT | Performed by: OBSTETRICS & GYNECOLOGY

## 2020-10-02 PROCEDURE — 99213 OFFICE O/P EST LOW 20 MIN: CPT | Performed by: OBSTETRICS & GYNECOLOGY

## 2020-10-12 LAB
LAB AP GYN PRIMARY INTERPRETATION: NORMAL
Lab: NORMAL

## 2020-10-19 ENCOUNTER — HOSPITAL ENCOUNTER (OUTPATIENT)
Dept: ULTRASOUND IMAGING | Facility: HOSPITAL | Age: 40
Discharge: HOME | End: 2020-10-19
Attending: OBSTETRICS & GYNECOLOGY
Payer: COMMERCIAL

## 2020-10-19 DIAGNOSIS — R10.32 LLQ PAIN: ICD-10-CM

## 2020-10-19 PROCEDURE — 76856 US EXAM PELVIC COMPLETE: CPT

## 2020-10-23 ENCOUNTER — TELEPHONE (OUTPATIENT)
Dept: OBGYN CLINIC | Facility: MEDICAL CENTER | Age: 40
End: 2020-10-23

## 2020-11-13 ENCOUNTER — PROCEDURE VISIT (OUTPATIENT)
Dept: OBGYN CLINIC | Facility: CLINIC | Age: 40
End: 2020-11-13
Payer: COMMERCIAL

## 2020-11-13 ENCOUNTER — TELEPHONE (OUTPATIENT)
Dept: OBGYN CLINIC | Facility: MEDICAL CENTER | Age: 40
End: 2020-11-13

## 2020-11-13 VITALS — WEIGHT: 112.6 LBS | SYSTOLIC BLOOD PRESSURE: 112 MMHG | DIASTOLIC BLOOD PRESSURE: 60 MMHG | BODY MASS INDEX: 18.17 KG/M2

## 2020-11-13 DIAGNOSIS — N94.6 DYSMENORRHEA: ICD-10-CM

## 2020-11-13 DIAGNOSIS — N92.0 MENORRHAGIA WITH REGULAR CYCLE: ICD-10-CM

## 2020-11-13 DIAGNOSIS — R93.89 THICKENED ENDOMETRIUM: ICD-10-CM

## 2020-11-13 DIAGNOSIS — R10.32 CHRONIC LLQ PAIN: Primary | ICD-10-CM

## 2020-11-13 DIAGNOSIS — G89.29 CHRONIC LLQ PAIN: Primary | ICD-10-CM

## 2020-11-13 PROCEDURE — 88305 TISSUE EXAM BY PATHOLOGIST: CPT | Performed by: PATHOLOGY

## 2020-11-13 PROCEDURE — 99212 OFFICE O/P EST SF 10 MIN: CPT | Performed by: OBSTETRICS & GYNECOLOGY

## 2020-11-13 PROCEDURE — 58100 BIOPSY OF UTERUS LINING: CPT | Performed by: OBSTETRICS & GYNECOLOGY

## 2020-11-17 ENCOUNTER — TELEPHONE (OUTPATIENT)
Dept: OBGYN CLINIC | Facility: MEDICAL CENTER | Age: 40
End: 2020-11-17

## 2020-11-19 ENCOUNTER — TELEPHONE (OUTPATIENT)
Dept: OBGYN CLINIC | Facility: MEDICAL CENTER | Age: 40
End: 2020-11-19

## 2020-11-24 ENCOUNTER — TELEPHONE (OUTPATIENT)
Dept: OBGYN CLINIC | Facility: MEDICAL CENTER | Age: 40
End: 2020-11-24

## 2020-11-27 ENCOUNTER — TELEPHONE (OUTPATIENT)
Dept: OBGYN CLINIC | Facility: MEDICAL CENTER | Age: 40
End: 2020-11-27

## 2020-12-08 ENCOUNTER — TELEMEDICINE (OUTPATIENT)
Dept: INTERNAL MEDICINE CLINIC | Facility: CLINIC | Age: 40
End: 2020-12-08
Payer: COMMERCIAL

## 2020-12-08 VITALS — HEIGHT: 66 IN | WEIGHT: 112 LBS | TEMPERATURE: 98.1 F | BODY MASS INDEX: 18 KG/M2

## 2020-12-08 DIAGNOSIS — B34.9 VIRAL INFECTION, UNSPECIFIED: ICD-10-CM

## 2020-12-08 DIAGNOSIS — Z12.31 ENCOUNTER FOR SCREENING MAMMOGRAM FOR MALIGNANT NEOPLASM OF BREAST: Primary | ICD-10-CM

## 2020-12-08 DIAGNOSIS — Z03.818 ENCOUNTER FOR OBSERVATION FOR SUSPECTED EXPOSURE TO OTHER BIOLOGICAL AGENTS RULED OUT: ICD-10-CM

## 2020-12-08 PROCEDURE — 3008F BODY MASS INDEX DOCD: CPT | Performed by: PHYSICIAN ASSISTANT

## 2020-12-08 PROCEDURE — 99203 OFFICE O/P NEW LOW 30 MIN: CPT | Performed by: PHYSICIAN ASSISTANT

## 2020-12-08 PROCEDURE — 1036F TOBACCO NON-USER: CPT | Performed by: PHYSICIAN ASSISTANT

## 2020-12-08 PROCEDURE — U0003 INFECTIOUS AGENT DETECTION BY NUCLEIC ACID (DNA OR RNA); SEVERE ACUTE RESPIRATORY SYNDROME CORONAVIRUS 2 (SARS-COV-2) (CORONAVIRUS DISEASE [COVID-19]), AMPLIFIED PROBE TECHNIQUE, MAKING USE OF HIGH THROUGHPUT TECHNOLOGIES AS DESCRIBED BY CMS-2020-01-R: HCPCS | Performed by: PHYSICIAN ASSISTANT

## 2020-12-10 LAB — SARS-COV-2 RNA SPEC QL NAA+PROBE: NOT DETECTED

## 2020-12-11 ENCOUNTER — APPOINTMENT (OUTPATIENT)
Dept: RADIOLOGY | Facility: CLINIC | Age: 40
End: 2020-12-11

## 2020-12-11 ENCOUNTER — TRANSCRIBE ORDERS (OUTPATIENT)
Dept: URGENT CARE | Facility: CLINIC | Age: 40
End: 2020-12-11

## 2020-12-11 DIAGNOSIS — Z02.1 PHYSICAL EXAM, PRE-EMPLOYMENT: Primary | ICD-10-CM

## 2020-12-11 PROCEDURE — 71046 X-RAY EXAM CHEST 2 VIEWS: CPT

## 2020-12-15 ENCOUNTER — TELEPHONE (OUTPATIENT)
Dept: INTERNAL MEDICINE CLINIC | Facility: CLINIC | Age: 40
End: 2020-12-15

## 2021-09-08 ENCOUNTER — TELEPHONE (OUTPATIENT)
Dept: OBGYN CLINIC | Facility: MEDICAL CENTER | Age: 41
End: 2021-09-08

## 2021-10-26 ENCOUNTER — ANNUAL EXAM (OUTPATIENT)
Dept: OBGYN CLINIC | Facility: CLINIC | Age: 41
End: 2021-10-26
Payer: COMMERCIAL

## 2021-10-26 VITALS — WEIGHT: 113 LBS | HEIGHT: 66 IN | BODY MASS INDEX: 18.16 KG/M2

## 2021-10-26 DIAGNOSIS — Z01.419 ENCOUNTER FOR ROUTINE GYNECOLOGICAL EXAMINATION WITH PAPANICOLAOU SMEAR OF CERVIX: Primary | ICD-10-CM

## 2021-10-26 PROCEDURE — S0612 ANNUAL GYNECOLOGICAL EXAMINA: HCPCS | Performed by: OBSTETRICS & GYNECOLOGY

## 2021-10-26 PROCEDURE — G0476 HPV COMBO ASSAY CA SCREEN: HCPCS | Performed by: OBSTETRICS & GYNECOLOGY

## 2021-10-26 PROCEDURE — G0145 SCR C/V CYTO,THINLAYER,RESCR: HCPCS | Performed by: OBSTETRICS & GYNECOLOGY

## 2021-11-01 LAB
LAB AP GYN PRIMARY INTERPRETATION: NORMAL
Lab: NORMAL

## 2021-11-13 ENCOUNTER — OFFICE VISIT (OUTPATIENT)
Dept: URGENT CARE | Facility: CLINIC | Age: 41
End: 2021-11-13
Payer: COMMERCIAL

## 2021-11-13 VITALS
RESPIRATION RATE: 18 BRPM | HEART RATE: 85 BPM | TEMPERATURE: 102.1 F | OXYGEN SATURATION: 98 % | BODY MASS INDEX: 18.24 KG/M2 | WEIGHT: 113 LBS

## 2021-11-13 DIAGNOSIS — J20.9 ACUTE BRONCHITIS, UNSPECIFIED ORGANISM: ICD-10-CM

## 2021-11-13 DIAGNOSIS — Z20.822 EXPOSURE TO CONFIRMED CASE OF COVID-19: ICD-10-CM

## 2021-11-13 DIAGNOSIS — Z20.822 SUSPECTED COVID-19 VIRUS INFECTION: Primary | ICD-10-CM

## 2021-11-13 PROCEDURE — U0003 INFECTIOUS AGENT DETECTION BY NUCLEIC ACID (DNA OR RNA); SEVERE ACUTE RESPIRATORY SYNDROME CORONAVIRUS 2 (SARS-COV-2) (CORONAVIRUS DISEASE [COVID-19]), AMPLIFIED PROBE TECHNIQUE, MAKING USE OF HIGH THROUGHPUT TECHNOLOGIES AS DESCRIBED BY CMS-2020-01-R: HCPCS | Performed by: NURSE PRACTITIONER

## 2021-11-13 PROCEDURE — 99213 OFFICE O/P EST LOW 20 MIN: CPT | Performed by: NURSE PRACTITIONER

## 2021-11-13 PROCEDURE — U0005 INFEC AGEN DETEC AMPLI PROBE: HCPCS | Performed by: NURSE PRACTITIONER

## 2021-11-13 RX ORDER — ALBUTEROL SULFATE 90 UG/1
2 AEROSOL, METERED RESPIRATORY (INHALATION) EVERY 4 HOURS PRN
Qty: 25.5 G | Refills: 1 | Status: SHIPPED | OUTPATIENT
Start: 2021-11-13 | End: 2021-12-13

## 2021-11-15 LAB — SARS-COV-2 RNA RESP QL NAA+PROBE: POSITIVE

## 2021-11-16 ENCOUNTER — TELEMEDICINE (OUTPATIENT)
Dept: INTERNAL MEDICINE CLINIC | Facility: CLINIC | Age: 41
End: 2021-11-16
Payer: COMMERCIAL

## 2021-11-16 DIAGNOSIS — U07.1 COVID-19: Primary | ICD-10-CM

## 2021-11-16 PROCEDURE — 99213 OFFICE O/P EST LOW 20 MIN: CPT | Performed by: PHYSICIAN ASSISTANT

## 2021-11-18 ENCOUNTER — HOSPITAL ENCOUNTER (OUTPATIENT)
Dept: RADIOLOGY | Facility: HOSPITAL | Age: 41
Discharge: HOME | End: 2021-11-18
Payer: COMMERCIAL

## 2021-11-18 ENCOUNTER — TELEMEDICINE (OUTPATIENT)
Dept: INTERNAL MEDICINE CLINIC | Facility: CLINIC | Age: 41
End: 2021-11-18
Payer: COMMERCIAL

## 2021-11-18 DIAGNOSIS — U07.1 COVID-19: ICD-10-CM

## 2021-11-18 DIAGNOSIS — U07.1 COVID-19: Primary | ICD-10-CM

## 2021-11-18 PROCEDURE — 71046 X-RAY EXAM CHEST 2 VIEWS: CPT

## 2021-11-18 PROCEDURE — 99213 OFFICE O/P EST LOW 20 MIN: CPT | Performed by: PHYSICIAN ASSISTANT

## 2021-11-18 RX ORDER — LANOLIN ALCOHOL/MO/W.PET/CERES
1 CREAM (GRAM) TOPICAL 2 TIMES DAILY
COMMUNITY

## 2021-11-19 ENCOUNTER — TELEMEDICINE (OUTPATIENT)
Dept: INTERNAL MEDICINE CLINIC | Facility: CLINIC | Age: 41
End: 2021-11-19
Payer: COMMERCIAL

## 2021-11-19 DIAGNOSIS — U07.1 COVID-19: Primary | ICD-10-CM

## 2021-11-19 PROCEDURE — 99213 OFFICE O/P EST LOW 20 MIN: CPT | Performed by: PHYSICIAN ASSISTANT

## 2021-11-24 ENCOUNTER — TELEPHONE (OUTPATIENT)
Dept: INTERNAL MEDICINE CLINIC | Facility: CLINIC | Age: 41
End: 2021-11-24

## 2022-11-22 ENCOUNTER — ANNUAL EXAM (OUTPATIENT)
Dept: OBGYN CLINIC | Facility: CLINIC | Age: 42
End: 2022-11-22

## 2022-11-22 VITALS
SYSTOLIC BLOOD PRESSURE: 123 MMHG | WEIGHT: 115 LBS | HEIGHT: 66 IN | BODY MASS INDEX: 18.48 KG/M2 | DIASTOLIC BLOOD PRESSURE: 80 MMHG

## 2022-11-22 DIAGNOSIS — Z01.419 ENCOUNTER FOR GYNECOLOGICAL EXAMINATION (GENERAL) (ROUTINE) WITHOUT ABNORMAL FINDINGS: Primary | ICD-10-CM
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
## 2023-07-26 ENCOUNTER — TELEPHONE (OUTPATIENT)
Dept: OBGYN CLINIC | Facility: HOSPITAL | Age: 43
End: 2023-07-26

## 2023-07-26 ENCOUNTER — OFFICE VISIT (OUTPATIENT)
Dept: INTERNAL MEDICINE CLINIC | Facility: CLINIC | Age: 43
End: 2023-07-26
Payer: COMMERCIAL

## 2023-07-26 VITALS
TEMPERATURE: 98.7 F | BODY MASS INDEX: 18.04 KG/M2 | HEIGHT: 66 IN | SYSTOLIC BLOOD PRESSURE: 130 MMHG | HEART RATE: 101 BPM | OXYGEN SATURATION: 99 % | WEIGHT: 112.25 LBS | DIASTOLIC BLOOD PRESSURE: 80 MMHG

## 2023-07-26 DIAGNOSIS — M25.50 ARTHRALGIA, UNSPECIFIED JOINT: ICD-10-CM

## 2023-07-26 DIAGNOSIS — Z02.89 ENCOUNTER FOR COMPLETION OF FORM WITH PATIENT: ICD-10-CM

## 2023-07-26 DIAGNOSIS — M79.10 MYALGIA: ICD-10-CM

## 2023-07-26 DIAGNOSIS — S62.91XD CLOSED FRACTURE OF RIGHT HAND WITH ROUTINE HEALING, SUBSEQUENT ENCOUNTER: ICD-10-CM
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
## 2023-07-26 PROCEDURE — 99214 OFFICE O/P EST MOD 30 MIN: CPT | Performed by: INTERNAL MEDICINE

## 2023-07-26 RX ORDER — MELOXICAM 15 MG/1
15 TABLET ORAL DAILY
Qty: 30 TABLET | Refills: 1 | Status: SHIPPED | OUTPATIENT
Start: 2023-07-26

## 2023-07-26 RX ORDER — HYDROCODONE BITARTRATE AND ACETAMINOPHEN 5; 325 MG/1; MG/1
TABLET ORAL EVERY 6 HOURS PRN
COMMUNITY
Start: 2023-07-22 | End: 2023-07-31 | Stop reason: ALTCHOICE
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
## 2023-07-26 NOTE — TELEPHONE ENCOUNTER
Hello,  Please advise if the following patient can be forced onto the schedule:    Patient: Devika Cooper    : 1980    MRN:     Call back #: [phone]    Insurance: [id number] / Claim # [id number]  Patient will bring all information with to OV. Reason for appointment: R hand fracture   Patient referred by Dr Adilene Winters / Wilfrido Bowman. //  His office will be getting imaging sent over. Requested doctor/location: closest / soonest       Thank you.
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
## 2023-07-27 PROBLEM — S62.300A: Status: ACTIVE | Noted: 2023-07-27

## 2023-07-31 ENCOUNTER — APPOINTMENT (OUTPATIENT)
Dept: RADIOLOGY | Facility: CLINIC | Age: 43
End: 2023-07-31
Payer: COMMERCIAL

## 2023-07-31 ENCOUNTER — OFFICE VISIT (OUTPATIENT)
Dept: OCCUPATIONAL THERAPY | Facility: CLINIC | Age: 43
End: 2023-07-31
Payer: COMMERCIAL

## 2023-07-31 ENCOUNTER — OFFICE VISIT (OUTPATIENT)
Dept: OBGYN CLINIC | Facility: CLINIC | Age: 43
End: 2023-07-31
Payer: COMMERCIAL

## 2023-07-31 VITALS
DIASTOLIC BLOOD PRESSURE: 80 MMHG | BODY MASS INDEX: 18.48 KG/M2 | WEIGHT: 115 LBS | HEIGHT: 66 IN | SYSTOLIC BLOOD PRESSURE: 110 MMHG

## 2023-07-31 DIAGNOSIS — S62.300A CLOSED DISPLACED FRACTURE OF SECOND METACARPAL BONE OF RIGHT HAND, UNSPECIFIED PORTION OF METACARPAL, INITIAL ENCOUNTER: ICD-10-CM

## 2023-07-31 DIAGNOSIS — S62.91XD CLOSED FRACTURE OF RIGHT HAND WITH ROUTINE HEALING, SUBSEQUENT ENCOUNTER: ICD-10-CM

## 2023-07-31 DIAGNOSIS — S62.300A CLOSED DISPLACED FRACTURE OF SECOND METACARPAL BONE OF RIGHT HAND, UNSPECIFIED PORTION OF METACARPAL, INITIAL ENCOUNTER: Primary | ICD-10-CM
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
## 2023-07-31 PROCEDURE — 26600 TREAT METACARPAL FRACTURE: CPT | Performed by: ORTHOPAEDIC SURGERY

## 2023-07-31 PROCEDURE — 73130 X-RAY EXAM OF HAND: CPT

## 2023-07-31 PROCEDURE — L3808 WHFO, RIGID W/O JOINTS: HCPCS | Performed by: OCCUPATIONAL THERAPIST

## 2023-07-31 PROCEDURE — 99204 OFFICE O/P NEW MOD 45 MIN: CPT | Performed by: ORTHOPAEDIC SURGERY

## 2023-07-31 NOTE — LETTER
July 31, 2023     Patient: Sofiya Finch  YOB: 1980  Date of Visit: 7/31/2023      To Whom it May Concern:    Sofiya Finch is under my professional care. Stephany Cadet was seen in my office on 7/31/2023. Stephany Cadet is out of work at this time. She will be re-evaluated in 2 weeks time. If you have any questions or concerns, please don't hesitate to call.          Sincerely,          Rhonda Guillory MD

## 2023-07-31 NOTE — PROGRESS NOTES
Splint    Diagnosis:   1. Closed displaced fracture of second metacarpal bone of right hand, unspecified portion of metacarpal, initial encounter  Ambulatory Referral to PT/OT Hand Therapy         Indication: Fracture    Location: Right  index finger  Supplies: Orthotics  Thermoplastic material    Splint type: FA based clam shell  Wearing Schedule: Remove for hygiene only  Describe Position: Forearm based clamshell w. Index and long finger MP joint in neutral ( secondary to increased edema dorsum of the hand., PIP joint free    Precautions: Fracture    Patient or Caregiver expresses understanding of wearing Schedule and Precautions? Yes  Patient or Caregiver able to don/doff orthotic independently? Yes    Written orders provided to patient?  Yes  Orders Obtained: Written  Orders Obtained from: Dr. Otis Salinas
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
## 2023-08-02 ENCOUNTER — OFFICE VISIT (OUTPATIENT)
Dept: INTERNAL MEDICINE CLINIC | Facility: CLINIC | Age: 43
End: 2023-08-02
Payer: COMMERCIAL

## 2023-08-02 VITALS
HEIGHT: 66 IN | SYSTOLIC BLOOD PRESSURE: 116 MMHG | OXYGEN SATURATION: 98 % | TEMPERATURE: 98.1 F | BODY MASS INDEX: 18.16 KG/M2 | DIASTOLIC BLOOD PRESSURE: 60 MMHG | WEIGHT: 113 LBS | HEART RATE: 88 BPM

## 2023-08-02 DIAGNOSIS — M25.50 ARTHRALGIA, UNSPECIFIED JOINT: ICD-10-CM

## 2023-08-02 DIAGNOSIS — S62.91XD CLOSED FRACTURE OF RIGHT HAND WITH ROUTINE HEALING, SUBSEQUENT ENCOUNTER: ICD-10-CM

## 2023-08-02 DIAGNOSIS — M79.10 MYALGIA: ICD-10-CM
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
## 2023-08-02 PROCEDURE — 99213 OFFICE O/P EST LOW 20 MIN: CPT | Performed by: INTERNAL MEDICINE
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
## 2023-11-28 ENCOUNTER — ANNUAL EXAM (OUTPATIENT)
Dept: OBGYN CLINIC | Facility: CLINIC | Age: 43
End: 2023-11-28
Payer: COMMERCIAL

## 2023-11-28 VITALS
DIASTOLIC BLOOD PRESSURE: 75 MMHG | SYSTOLIC BLOOD PRESSURE: 115 MMHG | HEIGHT: 66 IN | BODY MASS INDEX: 18.48 KG/M2 | WEIGHT: 115 LBS

## 2023-11-28 DIAGNOSIS — Z01.419 ENCNTR FOR GYN EXAM (GENERAL) (ROUTINE) W/O ABN FINDINGS: Primary | ICD-10-CM

## 2023-11-28 DIAGNOSIS — Z23 NEED FOR HPV VACCINATION: ICD-10-CM

## 2023-11-28 DIAGNOSIS — Z12.31 ENCOUNTER FOR SCREENING MAMMOGRAM FOR MALIGNANT NEOPLASM OF BREAST: ICD-10-CM

## 2023-11-28 PROCEDURE — 90471 IMMUNIZATION ADMIN: CPT | Performed by: OBSTETRICS & GYNECOLOGY

## 2023-11-28 PROCEDURE — 90651 9VHPV VACCINE 2/3 DOSE IM: CPT | Performed by: OBSTETRICS & GYNECOLOGY

## 2023-11-28 PROCEDURE — S0612 ANNUAL GYNECOLOGICAL EXAMINA: HCPCS | Performed by: OBSTETRICS & GYNECOLOGY
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
## 2024-01-02 ENCOUNTER — APPOINTMENT (OUTPATIENT)
Dept: URGENT CARE | Facility: CLINIC | Age: 44
End: 2024-01-02

## 2024-09-09 ENCOUNTER — HOSPITAL ENCOUNTER (OUTPATIENT)
Dept: RADIOLOGY | Facility: HOSPITAL | Age: 44
Discharge: HOME | End: 2024-09-09

## 2024-09-09 DIAGNOSIS — Z00.8 HEALTH EXAMINATION IN POPULATION SURVEY: ICD-10-CM

## 2024-09-09 PROCEDURE — 71046 X-RAY EXAM CHEST 2 VIEWS: CPT

## 2024-10-07 ENCOUNTER — TELEPHONE (OUTPATIENT)
Dept: INTERNAL MEDICINE CLINIC | Facility: CLINIC | Age: 44
End: 2024-10-07

## 2025-02-25 ENCOUNTER — ANNUAL EXAM (OUTPATIENT)
Dept: OBGYN CLINIC | Facility: CLINIC | Age: 45
End: 2025-02-25
Payer: COMMERCIAL

## 2025-02-25 VITALS
DIASTOLIC BLOOD PRESSURE: 78 MMHG | WEIGHT: 119.8 LBS | HEIGHT: 66 IN | SYSTOLIC BLOOD PRESSURE: 118 MMHG | BODY MASS INDEX: 19.25 KG/M2

## 2025-02-25 DIAGNOSIS — Z30.016 ENCOUNTER FOR INITIAL PRESCRIPTION OF TRANSDERMAL PATCH HORMONAL CONTRACEPTIVE DEVICE: ICD-10-CM

## 2025-02-25 DIAGNOSIS — Z01.419 ENCOUNTER FOR ROUTINE GYNECOLOGICAL EXAMINATION WITH PAPANICOLAOU SMEAR OF CERVIX: Primary | ICD-10-CM

## 2025-02-25 DIAGNOSIS — Z11.3 SCREENING FOR STD (SEXUALLY TRANSMITTED DISEASE): ICD-10-CM

## 2025-02-25 DIAGNOSIS — N76.0 BACTERIAL VAGINOSIS: ICD-10-CM

## 2025-02-25 DIAGNOSIS — Z12.11 COLON CANCER SCREENING: ICD-10-CM

## 2025-02-25 DIAGNOSIS — B96.89 BACTERIAL VAGINOSIS: ICD-10-CM

## 2025-02-25 PROCEDURE — 87491 CHLMYD TRACH DNA AMP PROBE: CPT | Performed by: OBSTETRICS & GYNECOLOGY

## 2025-02-25 PROCEDURE — 87591 N.GONORRHOEAE DNA AMP PROB: CPT | Performed by: OBSTETRICS & GYNECOLOGY

## 2025-02-25 PROCEDURE — G0476 HPV COMBO ASSAY CA SCREEN: HCPCS | Performed by: OBSTETRICS & GYNECOLOGY

## 2025-02-25 PROCEDURE — S0612 ANNUAL GYNECOLOGICAL EXAMINA: HCPCS | Performed by: OBSTETRICS & GYNECOLOGY

## 2025-02-25 PROCEDURE — 99213 OFFICE O/P EST LOW 20 MIN: CPT | Performed by: OBSTETRICS & GYNECOLOGY

## 2025-02-25 PROCEDURE — G0145 SCR C/V CYTO,THINLAYER,RESCR: HCPCS | Performed by: OBSTETRICS & GYNECOLOGY

## 2025-02-25 RX ORDER — NORELGESTROMIN AND ETHINYL ESTRADIOL 35; 150 UG/MG; UG/MG
1 PATCH TRANSDERMAL WEEKLY
Qty: 9 PATCH | Refills: 3 | Status: SHIPPED | OUTPATIENT
Start: 2025-02-25

## 2025-02-25 RX ORDER — METRONIDAZOLE 500 MG/1
500 TABLET ORAL EVERY 12 HOURS SCHEDULED
Qty: 14 TABLET | Refills: 0 | Status: SHIPPED | OUTPATIENT
Start: 2025-02-25 | End: 2025-03-04

## 2025-02-25 NOTE — PROGRESS NOTES
ASSESSMENT & PLAN: Chaz was seen today for gynecologic exam.    Diagnoses and all orders for this visit:    Encounter for routine gynecological examination with Papanicolaou smear of cervix  -     Liquid-based pap, screening    Colon cancer screening    Encounter for initial prescription of transdermal patch hormonal contraceptive device  -     norelgestromin-ethinyl estradiol (ORTHO EVRA) 150-35 MCG/24HR; Place 1 patch on the skin over 7 days once a week    Bacterial vaginosis  -     metroNIDAZOLE (FLAGYL) 500 mg tablet; Take 1 tablet (500 mg total) by mouth every 12 (twelve) hours for 7 days    Screening for STD (sexually transmitted disease)  -     Chlamydia/GC amplified DNA by PCR        1.  Routine well woman exam done today  2.  Pap and HPV:  The patient's pap is not up to date.  Pap and cotesting was done today.  Current ASCCP Guidelines reviewed.   3.  Mammogram: Has order.  Patient encouraged to schedule.  4. The following were reviewed in today's visit: adequate intake of calcium and vitamin D, exercise, and healthy diet.  5. F/u 1 year for next routine GYN exam.  6.  Birth control: Reviewed contraceptive options.  Patient desires initiation of the contraceptive patch.  Prescription for Ortho Evra sent to patient's pharmacy.  Instructions reviewed.  7.  Bacterial vaginosis: Patient to start Flagyl.  Prescription sent to patient's pharmacy.  Instructions reviewed.  8.  STD testing: Gonorrhea and Chlamydia to be run off Pap.    CC:  Annual Gynecologic Examination    HPI: Chaz Borden is a 44 y.o.  who presents for annual gynecologic examination.  She has the following concerns:  has a vaginal odor since October.  Unsure if there is a discharge.  Pt's 24 yo son had a brain tumor and had to have brain surgery and a shunt placed in 2024.  Pt has been busy caring for her son.  Nephew passed away recently at 3 months.  Has only had one partner since October.  Wants STD testing.  Menses are very  heavy for the first two days.  Menses are monthly.  In September and October had menses q2 weeks.      Health Maintenance:    Patient describes her health as good.  Patient has weight concerns.  She has not been exercising regularly.   She does wear her seatbelt routinely.    She does perform regular monthly self breast exams.    She does feel safe at home.   Patients does follow a healthy diet.  2-3 serving of dairy daily.       Her pap:  nl and neg HPV      Last mammogram: n/a has order  Last colon cancer screening:     Patient Active Problem List   Diagnosis    DENISE II (cervical intraepithelial neoplasia II)    S/P LEEP (loop electrosurgical excision procedure)    Chronic LLQ pain    Thickened endometrium    Dysmenorrhea    Menorrhagia with regular cycle    Closed fracture of second metacarpal bone of right hand       Past Medical History:   Diagnosis Date    Abnormal Pap smear of cervix     Ovarian cyst     Left side    Wears glasses     PRN       Past Surgical History:   Procedure Laterality Date    EXAMINATION UNDER ANESTHESIA N/A 10/03/2019    Procedure: EXAM UNDER ANESTHESIA (EUA), REPAIR OF CERVICAL LACERATION;  Surgeon: Eva Luciano MD;  Location: [facility];  Service: Gynecology    HAND SURGERY  2023    INDUCED       [place] CONIZATION CERVIX W/WO D&C RPR ELTRD EXC N/A 2019    Procedure: BIOPSY LEEP CERVIX;  Surgeon: Eva Lucaino MD;  Location: [facility];  Service: Gynecology    WISDOM TOOTH EXTRACTION         Past OB/Gyn History:  Pt has menstrual issues, as above.  History of sexually transmitted infection: HPV.  History of abnormal pap smears: Yes, status post LEEP .  Patient is currently sexually active.  heterosexual. The current method of family planning is none.    Family History   Problem Relation Age of Onset    Stroke Mother     Diabetes Maternal Grandmother     Heart disease Paternal Grandfather     No Known Problems Father        Social History:  Social History      Socioeconomic History    Marital status: Single     Spouse name: Not on file    Number of children: Not on file    Years of education: Not on file    Highest education level: Not on file   Occupational History    Not on file   Tobacco Use    Smoking status: Never     Passive exposure: Never    Smokeless tobacco: Never   Vaping Use    Vaping status: Never Used   Substance and Sexual Activity    Alcohol use: Yes     Comment: Rarely    Drug use: Never    Sexual activity: Yes     Partners: Male     Birth control/protection: Condom Male   Other Topics Concern    Not on file   Social History Narrative    Not on file     Social Drivers of Health     Financial Resource Strain: Not on file   Food Insecurity: Not on file   Transportation Needs: Not on file   Physical Activity: Not on file   Stress: Not on file   Social Connections: Unknown (6/18/2024)    Received from [facility]     How often do you feel lonely or isolated from those around you? (Adult - for ages 18 years and over): Not on file   Intimate Partner Violence: Not on file   Housing Stability: Not on file     Presently lives with daughter.  Patient is currently employed works in a lab, tests coal.    No Known Allergies      Current Outpatient Medications:     metroNIDAZOLE (FLAGYL) 500 mg tablet, Take 1 tablet (500 mg total) by mouth every 12 (twelve) hours for 7 days, Disp: 14 tablet, Rfl: 0    multivitamin (THERAGRAN) TABS, Take 1 tablet by mouth daily, Disp: , Rfl:     naproxen sodium (ALEVE) 220 MG tablet, Take 220 mg by mouth as needed for mild pain, Disp: , Rfl:     norelgestromin-ethinyl estradiol (ORTHO EVRA) 150-35 MCG/24HR, Place 1 patch on the skin over 7 days once a week, Disp: 9 patch, Rfl: 3      Review of Systems  Constitutional :no fever, feels well, no tiredness, no recent weight gain or loss  ENT: no ear ache, no loss of hearing, no nosebleeds or nasal discharge, no sore throat or hoarseness.  Cardiovascular: no  "complaints of slow or fast heart beat, no chest pain, no palpitations, no leg claudication or lower extremity edema.  Respiratory: no complaints of shortness of shortness of breath, no MATAMOROS  Breasts:no complaints of breast pain, breast lump, or nipple discharge  Gastrointestinal: no complaints of abdominal pain, constipation, nausea, vomiting, or diarrhea or bloody stools  Genitourinary : no complaints of dysuria, incontinence, pelvic pain, no dysmenorrhea, +vaginal discharge, + abnormal vaginal bleeding and as noted in HPI.  Musculoskeletal: no complaints of arthralgia, no myalgia, no joint swelling or stiffness, no limb pain or swelling.  Integumentary: no complaints of skin rash or lesion, itching or dry skin  Neurological: no complaints of headache, no confusion, no numbness or tingling, no dizziness or fainting    Physical Exam:     /78   Ht 5' 6\" (1.676 m)   Wt 54.3 kg (119 lb 12.8 oz)   BMI 19.34 kg/m²     General: appears stated age, cooperative, alert normal mood and affect   Psychiatric oriented to person, place and time.  Mood and affect normal   Neck: normal, supple,trachea midline, no masses.  Thyroid: normal, no thyromegaly   Heart: regular rate and rhythm, S1, S2 normal, no murmur, click, rub or gallop   Lungs: clear to auscultation bilaterally, no increased work of breathing or signs of respiratory distress   Breasts: normal, no dimpling or skin changes noted, sm f-c changes outer quad b/l    Abdomen: soft, non-tender, without masses or organomegaly   Vulva: normal , no lesions   Vagina: normal , no lesions or dryness   Urethra: normal   Urethal meatus normal   Bladder Normal, soft, non-tender and no prolapse or masses appreciated   Cervix: normal, no palpable masses    Uterus: normal , non-tender, not enlarged, no palpable masses   Adnexa: normal, non-tender without fullness or masses   Lymphatic Palpation of lymph nodes in neck, axilla, groin and/or other locations: no lymphadenopathy or " masses noted   Skin Normal skin turgor and no rashes.  Palpation of skin and subcutaneous tissue normal.      Wet mount: Positive clue cells, no trichomonas  KOH: No hyphae

## 2025-02-25 NOTE — PATIENT INSTRUCTIONS
"Patient Education     Bacterial vaginosis   The Basics   Written by the doctors and editors at [facility]   What is bacterial vaginosis? -- Bacterial vaginosis, or \"BV,\" is an infection in the vagina that can cause bad-smelling vaginal discharge. \"Vaginal discharge\" means fluid that comes out of the vagina (figure 1). Some amount of vaginal discharge is normal. But people with BV can have a lot of vaginal discharge, or vaginal discharge that smells bad.  BV is caused by certain bacteria (germs). The vagina normally has different types of bacteria in it. But when the amounts or the types of bacteria change, an infection can happen.  BV usually affects people who are, or have been, sexually active. Your risk of getting it increases if you have a new sex partner or more than 1 partner. This is true whether your partners are male or female.  If you have BV, you have a higher chance of catching other infections that are spread through sex. You can lower this risk by using condoms when you have sex.  What are the symptoms of BV? -- Some people with BV have no symptoms. When symptoms do happen, they often include a \"fishy-smelling\" vaginal discharge. The discharge is watery and off-white or gray. The smell might be more noticeable:   During your period   After sex with a male partner - This happens when semen (the fluid that is released during sex) mixes with your vaginal fluids.  You might also notice a burning feeling in your vagina.  Is there a test for BV? -- Yes. Your doctor or nurse will do an exam. They will also take a sample of your vaginal discharge, and do lab tests to look for an infection.  How is BV treated? -- BV is treated with antibiotics. The medicines most often used are:   Metronidazole   Clindamycin   Tinidazole   Secnidazole  Some are pills you swallow. Others are a gel or cream you put inside your vagina. Some people have fewer side effects when they use the gel or cream. Other people prefer not to put " "medicine in the vagina. You and your doctor or nurse will decide which medicine is right for you.  It is important that you take all of the medicine your doctor or nurse prescribes, even if your symptoms go away after a few doses. Taking all of your medicine can help prevent the symptoms from coming back.  Do my sex partners need to be treated if I have BV? -- It depends:   If your sex partner does not have a vagina, they do not need to be treated if you have BV.   If your partner does have a vagina, tell them about your BV. That way, they can be treated if they also start to have symptoms.  What if my symptoms come back? -- Once you have had BV, it can come back, even if you are not having sex. If your symptoms come back, tell your doctor or nurse. You should be checked again and might need treatment with more medicine.  Some people get BV over and over again. If this happens to you, your doctor might suggest taking medicine for several months, or adding another medicine for a few weeks. This might help prevent future infections.  What if I am pregnant and have symptoms of BV? -- Tell your doctor or nurse. They will check for this infection and others.  If you have BV and are pregnant, the infection is treated with pills. Treatment is important, because BV might increase the risk of premature or \"\" birth.  Can BV be prevented? -- Sometimes. You can help lower the risk of BV by using condoms when you have sex.  You can also avoid things that increase the risk of BV. For example:   Avoid douching (putting liquid inside your vagina to rinse it out).   Do not smoke. Try to quit if you already smoke.   Avoid sharing sex toys, and clean toys between uses.  When should I call the doctor? -- Call your doctor or nurse for advice if:   Your vaginal discharge comes back or gets worse.   You have severe belly pain.   You have pain, itching, or burning in or around your vagina.   You have a fever of 100.4°F (38°C) or " higher.   You have vaginal bleeding not related to your period.   You have pain with sex.  All topics are updated as new evidence becomes available and our peer review process is complete.  This topic retrieved from [employer] on: Feb 26, 2024.  Topic 78370 Version 15.0  Release: 32.2.4 - C32.56  © 2024 UpToDate, Inc. and/or its affiliates. All rights reserved.  figure 1: Adult female external genitalia     This drawing shows the different parts of the genitals.  Graphic 17043 Version 9.0  Consumer Information Use and Disclaimer   Disclaimer: This generalized information is a limited summary of diagnosis, treatment, and/or medication information. It is not meant to be comprehensive and should be used as a tool to help the user understand and/or assess potential diagnostic and treatment options. It does NOT include all information about conditions, treatments, medications, side effects, or risks that may apply to a specific patient. It is not intended to be medical advice or a substitute for the medical advice, diagnosis, or treatment of a health care provider based on the health care provider's examination and assessment of a patient's specific and unique circumstances. Patients must speak with a health care provider for complete information about their health, medical questions, and treatment options, including any risks or benefits regarding use of medications. This information does not endorse any treatments or medications as safe, effective, or approved for treating a specific patient. UpToDate, Inc. and its affiliates disclaim any warranty or liability relating to this information or the use thereof.The use of this information is governed by the Terms of Use, available at https://www.wolterskluwer.com/en/know/clinical-effectiveness-terms. 2024© UpToDate, Inc. and its affiliates and/or licensors. All rights reserved.  Copyright   © 2024 UpToDate, Inc. and/or its affiliates. All rights reserved.  Patient  "Education     Hormonal birth control   The Basics   Written by the doctors and editors at [facility]   What is hormonal birth control? -- This is any pill, injection, device, or treatment that uses hormones to prevent pregnancy. There are a few different kinds of hormonal birth control. Some contain the hormones estrogen and progestin. Others contain only progestin.  While no birth control works 100 percent perfectly all of the time, hormonal methods work very well to prevent pregnancy. The methods differ in how easy they are to use and their side effects (table 1):   Pills - If you choose birth control pills, you need to take a pill every day. Skipping pills can increase the chance of getting pregnant. Birth control pill packets usually include 4 to 7 days of hormone-free pills each month. You get your period during these hormone-free days. If you prefer not to get a period, you can skip the hormone-free pills and take a hormone pill every day instead. This is called \"continuous dosing.\"  Most birth control pills contain estrogen and progestin, but some contain only progestin. One progestin-only pill (brand name: Opill) is available without a prescription in the .   Skin patches - There are a few different patches available (brand names: Xulane, Xafemy, Twirla). You can wear the patch on your shoulder, back, belly, or hip (figure 1). Some can also be worn on the upper arm. You change the patch once a week, and you put it in a new place each time. You typically wear a new patch each week for 3 weeks and then leave the patch off during week 4. You get your period during week 4. Skin patches for birth control contain both estrogen and progestin.   Vaginal rings - This is a flexible ring you put in your vagina (sample brand names: Annovera, NuvaRing) (figure 2). The ring releases the hormones estrogen and progestin.  Do not remove the ring when you have sex. You need to check before and after sex to make sure that it " "is in place. If the ring comes out, make sure that you know how quickly you need to put it back in. This depends on which brand of ring you have. In general, if it comes out for more than a few hours, you need to use another form of birth control to prevent pregnancy.  You typically keep the ring in for 3 weeks. Then, depending on which ring you have, you either throw it away or clean it and store it to put back in later. You do not use the ring during week 4, which is when you have your period.  You can choose to continue using a ring for longer than 3 weeks. If so, you will not have a regular period, although you might have some light bleeding or \"spotting.\"   Injections - If you use hormone injections, you will get a shot in the arm or buttocks every 3 months. Injections for birth control (brand name: Depo-Provera) contain only progestin.   Implants - A birth control implant is a tiny zo that releases hormones in the arm (figure 3). It must be implanted by a doctor or nurse and can stay in the arm for up to 3 years. Implants for birth control (brand name: Nexplanon) contain only progestin.   Hormone-containing intrauterine device (\"IUD\") - This device is placed inside the uterus to prevent pregnancy (figure 4). Some IUDs work by releasing hormones into the body (brand names: Mirena, Liletta, Kyleena, Mary Anne). Depending on which hormone-releasing IUD you get and your age, it can stay in place for 3 to 8 years. The hormone-releasing IUDs contain the hormone levonorgestrel, which is a progestin.  Hormonal birth control is a safe and reliable way to prevent pregnancy for most people. But it does not protect you from infections that spread through sex (called \"sexually transmitted infections\" or \"sexually transmitted diseases\").  Why else might a person use hormonal birth control? -- Hormonal birth control has other benefits besides preventing pregnancy. It can make your periods lighter or more regular. For this " "reason, it is also often used in the treatment of certain health conditions, including:   Heavy, irregular, or painful periods - Different things can affect your monthly period. Some people also get painful cramps or other symptoms.   Polycystic ovary syndrome (\"PCOS\") - This condition can cause irregular periods, acne, extra facial hair, or hair loss from the head.   Menstrual migraines - These are migraine headaches that are triggered by hormone changes around the monthly period. Hormonal birth control might be an option for treatment, as long as you do not get migraines with an \"aura.\" An aura is a symptom or feeling that happens before or during the headache. For example, some people see flashing lights, bright spots, or zig-zag lines, or lose part of their vision.  If you have any of these conditions, your doctor or nurse might suggest the pill or another form of hormonal birth control. Do not try using birth control to treat a health condition without talking to your doctor or nurse first.  How do I choose the right hormonal birth control for me? -- Work with your doctor or nurse to choose the best option for you. Think about how likely you are to use each method the right way. Can you remember to take a pill every day? Can you remember to change a patch once a week? Long-acting methods (IUD, implant) are the most convenient because they work for 3 to 10 years, depending on the method. The injection, which works for 3 months, might be more convenient than the pill, patch, or ring. Also, ask your doctor how the method you are thinking about will affect your period. The table has a list of side effects and risks for each of the different forms (table 1).  Is hormonal birth control safe for everyone? -- No. Some people should not use estrogen-containing hormonal birth control. This includes those who:   Are age 35 or older and smoke cigarettes - These things increase your risk for heart attacks and strokes.   " "Could possibly be pregnant - Before prescribing hormonal birth control, your doctor or nurse will ask questions to make sure that you are not pregnant. You might need to take a pregnancy test to confirm this.   Have had blood clots or a stroke in the past   Are being treated for breast cancer, or have had breast cancer before   Have some types of liver disease - Hormonal birth control can make some types of liver disease worse.   Have some types of heart disease   Get the type of migraine headaches that cause vision or hearing problems  If you have high blood pressure, you can still use hormonal birth control. But your blood pressure needs to be well controlled and regularly checked by a doctor.  Many people who can't take estrogen-containing hormonal birth control can take other kinds of hormonal birth control that contain only progestin. Or they can use methods that do not contain hormones.  What if I take medicines besides birth control? -- Some medicines can affect how well hormonal birth control works. These include:   Some medicines used to prevent seizures (called \"anticonvulsants\")   Certain antibiotics used to treat tuberculosis (rifampin and rifabutin)   Jud's Wort (an herbal medicine for depression)  If you take any of these medicines, talk to your doctor about how to handle birth control. Also, if you already take hormonal birth control, tell any doctor or nurse who might be prescribing medicines for you.  What if I forget to use my hormonal birth control? -- If you have sex and forgot to use your birth control, you can take emergency contraception to reduce your risk of pregnancy. Some forms of emergency contraception require a prescription, but you can buy others in a pharmacy. The IUD is the most effective method of emergency contraception, but requires a doctor or nurse to insert it. If you need to use emergency contraception, do it as soon as possible after sex.  All topics are updated as new " evidence becomes available and our peer review process is complete.  This topic retrieved from [employer] on: Mar 20, 2024.  Topic 57878 Version 21.0  Release: 32.2.4 - C32.78  © 2024 UpToDate, Inc. and/or its affiliates. All rights reserved.  table 1: Hormonal birth control  Method  Using the method  Some side effects and risks    Implantable zo   Hormones it contains: Progestin  Expected pregnancies per 100 people in first year of use: Less than 1 Must be inserted by a doctor.  Nothing to do or remember.  Lasts up to 3 years. Most common side effects:   Bleeding between periods  Changes in periods  Other possible side effects:  Headache  Acne  Sore breasts  Weight gain  Mood changes   IUD with progestin   Hormones it contains: Progestin  Expected pregnancies per 100 people in first year of use: Less than 1 Must be inserted by a doctor.  Nothing to do or remember.  Lasts 3 to 8 years depending on type. Most common side effects:   Bleeding between periods  Regular periods may stop  Other possible side effects:  Cramps  Potential but uncommon risks:  IUD can slip out  IUD can damage the uterus  Insertion of IUD can lead to a type of infection that can make it hard to get pregnant after the IUD is removed   Shot/injection   Hormones it contains: Progestin  Expected pregnancies per 100 people in first year of use: 4 to 7 Doctor or nurse must give you a shot every 3 months. Most common side effects:   Bleeding between periods  Regular periods may stop  Other possible side effects:  Temporary bone thinning  Weight gain  Mood changes  Hair loss or increased hair on face or body  Sore breasts  Headache   Progestin-only pill   Hormones it contains: Progestin  Expected pregnancies per 100 people in first year of use: 4 to 7 You must swallow a pill at the same time every day. Most common side effects:   Bleeding between periods (this is more common with progestin-only pills than with combined estrogen-progestin pills)  Other  possible side effects:  Sore breasts  Acne   Combination estrogen-progestin pill   Hormones it contains: Progestin and estrogen  Expected pregnancies per 100 people in first year of use: 4 to 7 You must swallow a pill every day. Most common side effects:   Bleeding between periods  Decreased bleeding during period  Other possible side effects:  Nausea  Changes in mood  Rare but serious risks include:  Heart attack  Stroke  Blood clots  High blood pressure  Liver tumors  Gallstones  Yellowing of the skin or eyes (jaundice)   Patch   Hormones it contains: Progestin and estrogen  Expected pregnancies per 100 people in first year of use: 4 to 7 You must wear a patch on your skin all of the time for 3 weeks. Every week, you change the patch. During the 4th week, you do not use a patch. Side effects and risks are similar to those of combined estrogen-progestin pills, but the patch causes higher average levels of estrogen than most pills. This may increase the risk of blood clots.  Other possible side effects:  Skin irritation where the patch is applied   Vaginal ring   Hormones it contains: Progestin and estrogen  Expected pregnancies per 100 people in first year of use: 4 to 7 You must put the ring into your vagina and leave it in for 3 weeks. During the 4th week, you do not use a ring. Side effects and risks are similar to those of the combined estrogen-progestin pill. (Breast soreness and nausea may be less than with the pill.)  Other possible side effects:  Vaginal discharge or irritation   The methods listed here all require a prescription. This table applies to people without medical problems, such as a history of cancer, blood clots, or liver disease. If you have any medical problems, ask your doctor or nurse whether you should avoid any type of hormonal birth control. If you are breastfeeding, tell your doctor or nurse when you choose birth control.  IUD: intrauterine device.  Graphic 94510 Version 8.0  figure 1:  Birth control skin patch     This picture shows a birth control patch on the upper back. You can wear the patch on your shoulder, back, belly, or hip. One type of patch can also be worn on the upper arm.  The skin patch delivers hormones into the body that help prevent pregnancy.  Graphic 54968 Version 9.0  figure 2: Vaginal ring     This picture shows the birth control ring. It is a flexible ring that you put in your vagina for 3 weeks at a time. It delivers hormones into the body that help prevent pregnancy.  Graphic 25316 Version 5.0  figure 3: Birth control implant     This picture shows the shape and size of the birth control implant. It is implanted into the upper arm, where it releases hormones that help prevent pregnancy.  Graphic 18265 Version 5.0  figure 4: IUDs     This picture shows 2 types of IUDs. There are different IUDs available. They are placed inside the uterus to help prevent pregnancy. Some hormonal IUDs can help reduce menstrual bleeding. The copper IUD can actually make menstrual bleeding heavier.  Graphic 52207 Version 15.0  Consumer Information Use and Disclaimer   Disclaimer: This generalized information is a limited summary of diagnosis, treatment, and/or medication information. It is not meant to be comprehensive and should be used as a tool to help the user understand and/or assess potential diagnostic and treatment options. It does NOT include all information about conditions, treatments, medications, side effects, or risks that may apply to a specific patient. It is not intended to be medical advice or a substitute for the medical advice, diagnosis, or treatment of a health care provider based on the health care provider's examination and assessment of a patient's specific and unique circumstances. Patients must speak with a health care provider for complete information about their health, medical questions, and treatment options, including any risks or benefits regarding use of  medications. This information does not endorse any treatments or medications as safe, effective, or approved for treating a specific patient. UpToDate, Inc. and its affiliates disclaim any warranty or liability relating to this information or the use thereof.The use of this information is governed by the Terms of Use, available at https://www.OnLive.com/en/know/clinical-effectiveness-terms. 2024© UpToDate, Inc. and its affiliates and/or licensors. All rights reserved.  Copyright   © 2024 UpToDate, Inc. and/or its affiliates. All rights reserved.

## 2025-02-27 LAB
C TRACH DNA SPEC QL NAA+PROBE: NEGATIVE
N GONORRHOEA DNA SPEC QL NAA+PROBE: NEGATIVE

## 2025-03-03 ENCOUNTER — RESULTS FOLLOW-UP (OUTPATIENT)
Dept: OBGYN CLINIC | Facility: MEDICAL CENTER | Age: 45
End: 2025-03-03

## 2025-03-03 ENCOUNTER — TELEPHONE (OUTPATIENT)
Dept: OTHER | Facility: OTHER | Age: 45
End: 2025-03-03

## 2025-03-03 LAB
LAB AP GYN PRIMARY INTERPRETATION: NORMAL
Lab: NORMAL
PATH INTERP SPEC-IMP: NORMAL

## 2025-03-03 NOTE — RESULT ENCOUNTER NOTE
Please notify pt of abnormal result: nl pap with +HPV.  BV was also seen on Pap.  Recommend repeat Pap and HPV in 1 year.  Prescription for Flagyl sent to patient's pharmacy.

## 2025-03-03 NOTE — TELEPHONE ENCOUNTER
"Patient stated, \"I received a call from my OBGYN office about my PAP results.\"   A.  Relayed results to (patient/patient representative as listed on communication consent form) as per provider message. Patient/Patient Representative expressed understanding and had the following question(s): \"I had positive HPV and had surgery in the past so I would like to speak to the doctor about my positive HPV results.\"  Please advise.     B. Route to OFFICE CLINICAL POOL for follow-up with provider.       "

## 2025-03-04 ENCOUNTER — TELEPHONE (OUTPATIENT)
Dept: OBGYN CLINIC | Facility: MEDICAL CENTER | Age: 45
End: 2025-03-04

## 2025-03-04 NOTE — TELEPHONE ENCOUNTER
Called pt regarding her questions/concerns about her HPV results,explained in detailed to pt and pt is okay will providers guidelines to repeat both pap/HPV in a year.

## 2025-06-10 ENCOUNTER — VBI (OUTPATIENT)
Dept: ADMINISTRATIVE | Facility: OTHER | Age: 45
End: 2025-06-10

## 2025-06-10 NOTE — TELEPHONE ENCOUNTER
06/10/25 2:03 PM     Chart reviewed for [facility]: Colonoscopy was/were not submitted to the patient's insurance.     Aster Francois MA   [facility]

## (undated) DEVICE — LLETZ LOOP 20 X 12MM MEGADYNE

## (undated) DEVICE — BETHLEHEM UNIVERSAL MINOR VAG: Brand: CARDINAL HEALTH

## (undated) DEVICE — CAUTERY TIP POLISHER: Brand: DEVON

## (undated) DEVICE — SCD SEQUENTIAL COMPRESSION COMFORT SLEEVE MEDIUM KNEE LENGTH: Brand: KENDALL SCD

## (undated) DEVICE — LLETZ LOOP 20 X 15MM MEGADYNE

## (undated) DEVICE — ASTOUND STANDARD SURGICAL GOWN, XL: Brand: CONVERTORS

## (undated) DEVICE — SUT SILK 2-0 SH 30 IN K833H

## (undated) DEVICE — GLOVE INDICATOR PI UNDERGLOVE SZ 6.5 BLUE

## (undated) DEVICE — PREMIUM DRY TRAY LF: Brand: MEDLINE INDUSTRIES, INC.

## (undated) DEVICE — SMOKE EVACUATION TUBING WITH 7/8 IN TO 1/4 IN REDUCER: Brand: BUFFALO FILTER

## (undated) DEVICE — STANDARD SURGICAL GOWN, L: Brand: CONVERTORS

## (undated) DEVICE — ELECTRODE BLADE E-Z CLEAN 6.5IN -0014

## (undated) DEVICE — GLOVE INDICATOR PI UNDERGLOVE SZ 7 BLUE

## (undated) DEVICE — GLOVE PI ULTRA TOUCH SZ.6.5

## (undated) DEVICE — UNDYED BRAIDED (POLYGLACTIN 910), SYNTHETIC ABSORBABLE SUTURE: Brand: COATED VICRYL

## (undated) DEVICE — DRAPE EQUIPMENT RF WAND

## (undated) DEVICE — TUBING SUCTION 5MM X 12 FT

## (undated) DEVICE — ELECTRODE BALL E-Z CLEAN 5 IN -0009

## (undated) DEVICE — GLOVE PI ULTRA TOUCH SZ 6

## (undated) DEVICE — STERILE 8 INCH PROCTO SWAB: Brand: CARDINAL HEALTH

## (undated) DEVICE — GLOVE INDICATOR UNDERGLOVE SZ 6 BLUE

## (undated) DEVICE — PENCIL ELECTROSURG E-Z CLEAN -0035H

## (undated) DEVICE — PVC URETHRAL CATHETER: Brand: DOVER